# Patient Record
Sex: MALE | Race: WHITE | NOT HISPANIC OR LATINO | Employment: FULL TIME | URBAN - METROPOLITAN AREA
[De-identification: names, ages, dates, MRNs, and addresses within clinical notes are randomized per-mention and may not be internally consistent; named-entity substitution may affect disease eponyms.]

---

## 2017-03-06 RX ORDER — CLOPIDOGREL BISULFATE 75 MG/1
TABLET ORAL
Qty: 30 TABLET | Refills: 5 | Status: SHIPPED | OUTPATIENT
Start: 2017-03-06 | End: 2017-07-18 | Stop reason: SDUPTHER

## 2017-03-06 NOTE — TELEPHONE ENCOUNTER
Provider:  Suhail  Caller:   michel  Phone #:  automated  Surgery:  n/a  Surgery Date:  n/a  Last visit:    7/25/16    TAMIE:         Reason for call:       Automated refill request

## 2017-07-18 DIAGNOSIS — I66.9 CEREBRAL ARTERY OCCLUSION: Primary | ICD-10-CM

## 2017-07-18 RX ORDER — CLOPIDOGREL BISULFATE 75 MG/1
75 TABLET ORAL DAILY
Qty: 30 TABLET | Refills: 2 | Status: SHIPPED | OUTPATIENT
Start: 2017-07-18 | End: 2017-12-11 | Stop reason: SDUPTHER

## 2017-07-18 NOTE — TELEPHONE ENCOUNTER
Provider:  Suhail  Caller: pharmacy fax   Last visit:   7/25/16  Next visit: no    Reason for call:         Refill fax request

## 2017-12-11 DIAGNOSIS — I66.9 CEREBRAL ARTERY OCCLUSION: ICD-10-CM

## 2017-12-11 RX ORDER — CLOPIDOGREL BISULFATE 75 MG/1
75 TABLET ORAL DAILY
Qty: 30 TABLET | Refills: 2 | Status: SHIPPED | OUTPATIENT
Start: 2017-12-11 | End: 2018-03-20 | Stop reason: SDUPTHER

## 2017-12-11 NOTE — TELEPHONE ENCOUNTER
Provider: Suhail    Caller: pharmacy  Time of call:     Phone #: [phone]   Surgery:n/a     Surgery Date:    Last visit:7/25/16     Next visit: n/a    TAMIE:         Reason for call:       Refill request

## 2018-03-20 DIAGNOSIS — I66.9 CEREBRAL ARTERY OCCLUSION: ICD-10-CM

## 2018-03-20 RX ORDER — CLOPIDOGREL BISULFATE 75 MG/1
TABLET ORAL
Qty: 30 TABLET | Refills: 2 | Status: SHIPPED | OUTPATIENT
Start: 2018-03-20 | End: 2018-06-04 | Stop reason: SDUPTHER

## 2018-03-20 NOTE — TELEPHONE ENCOUNTER
Provider:  Suhail  Pharmacy: [facility]  Last visit:   07/25/16  Next visit: none scheduled    Reason for call:       Automated refill request from patient's pharmacy

## 2018-06-04 DIAGNOSIS — I66.9 CEREBRAL ARTERY OCCLUSION: ICD-10-CM

## 2018-06-04 RX ORDER — CLOPIDOGREL BISULFATE 75 MG/1
TABLET ORAL
Qty: 30 TABLET | Refills: 2 | Status: SHIPPED | OUTPATIENT
Start: 2018-06-04 | End: 2018-09-18 | Stop reason: SDUPTHER

## 2018-06-04 NOTE — TELEPHONE ENCOUNTER
Provider:  Suhail  Pharmacy: [facility]  Last visit:   07/25/16  Next visit: none scheduled      Reason for call:         Automated refill request.

## 2018-09-18 DIAGNOSIS — I66.9 CEREBRAL ARTERY OCCLUSION: ICD-10-CM

## 2018-09-18 RX ORDER — CLOPIDOGREL BISULFATE 75 MG/1
TABLET ORAL
Qty: 30 TABLET | Refills: 2 | Status: SHIPPED | OUTPATIENT
Start: 2018-09-18 | End: 2018-12-29 | Stop reason: SDUPTHER

## 2018-09-18 NOTE — TELEPHONE ENCOUNTER
Provider:  Suhail  Caller: pharmacy     Phone #: [phone]   Surgery: n/a    Last visit: 7/2016    Next visit:     TAMIE:         Reason for call:       Requested Prescriptions     Pending Prescriptions Disp Refills   • clopidogrel (PLAVIX) 75 MG tablet [Pharmacy Med Name: CLOPIDOGREL 75 MG TABLET] 30 tablet 2     Sig: TAKE 1 TABLET BY MOUTH DAILY

## 2018-12-29 DIAGNOSIS — I66.9 CEREBRAL ARTERY OCCLUSION: ICD-10-CM

## 2018-12-29 RX ORDER — CLOPIDOGREL BISULFATE 75 MG/1
TABLET ORAL
Qty: 30 TABLET | Refills: 2 | Status: SHIPPED | OUTPATIENT
Start: 2018-12-29 | End: 2019-08-02 | Stop reason: SDUPTHER

## 2018-12-29 NOTE — TELEPHONE ENCOUNTER
Provider:  Suhail  Caller: michel  Time of call:   n/a  Phone #: n/a  Last visit:   7/25/16  Next visit:  none       Reason for call:         Automated refill request.

## 2019-08-02 DIAGNOSIS — I66.9 CEREBRAL ARTERY OCCLUSION: ICD-10-CM

## 2019-08-02 RX ORDER — CLOPIDOGREL BISULFATE 75 MG/1
TABLET ORAL
Qty: 90 TABLET | Refills: 0 | Status: SHIPPED | OUTPATIENT
Start: 2019-08-02 | End: 2019-10-26 | Stop reason: SDUPTHER

## 2019-08-02 NOTE — TELEPHONE ENCOUNTER
Provider:  Suhail  Caller:  Automated refill request  Surgery:  NA  Surgery Date:  NA  Last visit:  7/25/2016  Next visit: TBD    Reason for call:         Requested Prescriptions     Pending Prescriptions Disp Refills   • clopidogrel (PLAVIX) 75 MG tablet [Pharmacy Med Name: CLOPIDOGREL 75 MG TABLET] 90 tablet 0     Sig: TAKE 1 TABLET BY MOUTH EVERY DAY

## 2019-10-26 DIAGNOSIS — I66.9 CEREBRAL ARTERY OCCLUSION: ICD-10-CM

## 2019-10-28 RX ORDER — CLOPIDOGREL BISULFATE 75 MG/1
TABLET ORAL
Qty: 90 TABLET | Refills: 0 | Status: SHIPPED | OUTPATIENT
Start: 2019-10-28 | End: 2020-01-21

## 2019-10-28 NOTE — TELEPHONE ENCOUNTER
Provider: Suhail   Caller:  Automated refill request  Surgery: n/a   Surgery Date: n/a  Last visit: 07/25/2016   Next visit: TBD    Reason for call:         Requested Prescriptions     Pending Prescriptions Disp Refills   • clopidogrel (PLAVIX) 75 MG tablet [Pharmacy Med Name: CLOPIDOGREL 75 MG TABLET] 90 tablet 0     Sig: TAKE 1 TABLET BY MOUTH EVERY DAY

## 2020-01-21 DIAGNOSIS — I66.9 CEREBRAL ARTERY OCCLUSION: ICD-10-CM

## 2020-01-21 RX ORDER — CLOPIDOGREL BISULFATE 75 MG/1
TABLET ORAL
Qty: 90 TABLET | Refills: 2 | Status: SHIPPED | OUTPATIENT
Start: 2020-01-21 | End: 2020-10-19

## 2020-10-17 DIAGNOSIS — I66.9 CEREBRAL ARTERY OCCLUSION: ICD-10-CM

## 2020-10-19 RX ORDER — CLOPIDOGREL BISULFATE 75 MG/1
TABLET ORAL
Qty: 90 TABLET | Refills: 2 | Status: SHIPPED | OUTPATIENT
Start: 2020-10-19

## 2020-10-19 NOTE — TELEPHONE ENCOUNTER
Requested Prescriptions     Pending Prescriptions Disp Refills   • clopidogrel (PLAVIX) 75 MG tablet [Pharmacy Med Name: CLOPIDOGREL 75 MG TABLET] 90 tablet 2     Sig: TAKE 1 TABLET BY MOUTH EVERY DAY

## 2021-01-19 RX ORDER — ATORVASTATIN CALCIUM 80 MG/1
80 TABLET, FILM COATED ORAL DAILY
Qty: 90 TABLET | Refills: 3 | Status: SHIPPED | OUTPATIENT
Start: 2021-01-19 | End: 2022-01-31

## 2021-01-26 ENCOUNTER — LAB (OUTPATIENT)
Dept: LAB | Facility: HOSPITAL | Age: 56
End: 2021-01-26

## 2021-01-26 ENCOUNTER — OFFICE VISIT (OUTPATIENT)
Dept: ENDOCRINOLOGY | Facility: CLINIC | Age: 56
End: 2021-01-26

## 2021-01-26 VITALS
DIASTOLIC BLOOD PRESSURE: 86 MMHG | HEIGHT: 70 IN | SYSTOLIC BLOOD PRESSURE: 138 MMHG | TEMPERATURE: 97.1 F | BODY MASS INDEX: 30.81 KG/M2 | WEIGHT: 215.2 LBS

## 2021-01-26 DIAGNOSIS — E11.65 TYPE 2 DIABETES MELLITUS WITH HYPERGLYCEMIA, WITH LONG-TERM CURRENT USE OF INSULIN (HCC): ICD-10-CM

## 2021-01-26 DIAGNOSIS — Z79.4 TYPE 2 DIABETES MELLITUS WITH HYPERGLYCEMIA, WITH LONG-TERM CURRENT USE OF INSULIN (HCC): ICD-10-CM

## 2021-01-26 DIAGNOSIS — Z79.4 TYPE 2 DIABETES MELLITUS WITH HYPERGLYCEMIA, WITH LONG-TERM CURRENT USE OF INSULIN (HCC): Primary | ICD-10-CM

## 2021-01-26 DIAGNOSIS — E11.65 TYPE 2 DIABETES MELLITUS WITH HYPERGLYCEMIA, WITH LONG-TERM CURRENT USE OF INSULIN (HCC): Primary | ICD-10-CM

## 2021-01-26 DIAGNOSIS — I10 ESSENTIAL HYPERTENSION: ICD-10-CM

## 2021-01-26 DIAGNOSIS — E78.2 MIXED HYPERLIPIDEMIA: ICD-10-CM

## 2021-01-26 LAB
EXPIRATION DATE: NORMAL
HBA1C MFR BLD: 8.7 %
Lab: NORMAL

## 2021-01-26 PROCEDURE — 83036 HEMOGLOBIN GLYCOSYLATED A1C: CPT | Performed by: PHYSICIAN ASSISTANT

## 2021-01-26 PROCEDURE — 80053 COMPREHEN METABOLIC PANEL: CPT

## 2021-01-26 PROCEDURE — 84443 ASSAY THYROID STIM HORMONE: CPT

## 2021-01-26 PROCEDURE — 82570 ASSAY OF URINE CREATININE: CPT

## 2021-01-26 PROCEDURE — 99204 OFFICE O/P NEW MOD 45 MIN: CPT | Performed by: PHYSICIAN ASSISTANT

## 2021-01-26 PROCEDURE — 95251 CONT GLUC MNTR ANALYSIS I&R: CPT | Performed by: PHYSICIAN ASSISTANT

## 2021-01-26 PROCEDURE — 80061 LIPID PANEL: CPT

## 2021-01-26 PROCEDURE — 82043 UR ALBUMIN QUANTITATIVE: CPT

## 2021-01-26 RX ORDER — INSULIN DEGLUDEC INJECTION 100 U/ML
INJECTION, SOLUTION SUBCUTANEOUS
COMMUNITY
Start: 2020-11-17 | End: 2021-04-29

## 2021-01-26 RX ORDER — PROCHLORPERAZINE 25 MG/1
SUPPOSITORY RECTAL
Qty: 9 EACH | Refills: 3 | Status: SHIPPED | OUTPATIENT
Start: 2021-01-26 | End: 2021-12-30 | Stop reason: SDUPTHER

## 2021-01-26 RX ORDER — PROCHLORPERAZINE 25 MG/1
1 SUPPOSITORY RECTAL
Qty: 1 EACH | Refills: 3 | Status: SHIPPED | OUTPATIENT
Start: 2021-01-26 | End: 2021-12-30 | Stop reason: SDUPTHER

## 2021-01-26 RX ORDER — INSULIN ASPART 100 [IU]/ML
INJECTION, SOLUTION INTRAVENOUS; SUBCUTANEOUS
Qty: 15 ML | Refills: 1
Start: 2021-01-26 | End: 2022-02-03 | Stop reason: SDUPTHER

## 2021-01-26 RX ORDER — HYDROCHLOROTHIAZIDE 25 MG/1
25 TABLET ORAL DAILY
COMMUNITY
End: 2021-06-08 | Stop reason: SDUPTHER

## 2021-01-26 RX ORDER — PROCHLORPERAZINE 25 MG/1
SUPPOSITORY RECTAL
COMMUNITY
Start: 2020-12-28 | End: 2021-01-26 | Stop reason: SDUPTHER

## 2021-01-26 NOTE — PROGRESS NOTES
"     Office Note      Date: 2021  Patient Name: Raffy Pepe  MRN: [id number]  : 1965    Chief Complaint   Patient presents with   • Follow-up   • Diabetes       History of Present Illness:   Raffy Pepe is a 55 y.o. male who presents for type 2 diabetes.  He reports he is not surprised his A1c is up some had some higher readings over the holidays in the first week in January he had 3 or 4 days where his blood sugar remained elevated.  He is not sure if he had a virus or what was going on but after a few days his blood sugars came back down.  He remains on Tresiba 38 units daily and NovoLog with meals.  He wears a Dexcom G6 continuous glucose monitor and this is working with no trouble.  He reports he is due for his annual eye exam and will try to get this scheduled.  He does not get flu vaccines.  He denies any trouble with his feet today.  He reports he came fasting today since he is due for labs.  He remains on atorvastatin 80 mg daily for his cholesterol and is tolerating this with no trouble.  He remains on hydrochlorothiazide 25 mg daily and lisinopril 40 mg daily for hypertension.      Subjective     Review of Systems:   Review of Systems   Constitutional: Negative.    Cardiovascular: Negative.    Gastrointestinal: Negative.    Endocrine: Negative.    Neurological: Negative.        The following portions of the patient's history were reviewed and updated as appropriate: allergies, current medications, past family history, past medical history, past social history, past surgical history and problem list.    Objective     Vitals:    21 1015   BP: 138/86   Temp: 97.1 °F (36.2 °C)   TempSrc: Infrared   Weight: 97.6 kg (215 lb 3.2 oz)   Height: 177.8 cm (70\")     Body mass index is 30.88 kg/m².    Physical Exam  Vitals signs reviewed.   Constitutional:       General: He is not in acute distress.     Appearance: Normal appearance.   Cardiovascular:      Pulses:           Dorsalis " pedis pulses are 2+ on the right side and 2+ on the left side.        Posterior tibial pulses are 2+ on the right side and 2+ on the left side.   Feet:      Right foot:      Protective Sensation: 5 sites tested. 5 sites sensed.      Skin integrity: Skin integrity normal.      Toenail Condition: Right toenails are normal.      Left foot:      Protective Sensation: 5 sites tested. 5 sites sensed.      Skin integrity: Skin integrity normal.      Toenail Condition: Left toenails are normal.      Comments: Diabetic Foot Exam Performed and Monofilament Test Performed    Neurological:      Mental Status: He is alert.         HEMOGLOBIN A1C  Lab Results   Component Value Date    HGBA1C 8.7 01/26/2021     Assessment / Plan      Assessment & Plan:  1. Type 2 diabetes mellitus with hyperglycemia, with long-term current use of insulin (CMS/[facility])  A1c above goal and higher than last time this was checked at 8.7%.  I reviewed his Dexcom download we will increase his Tresiba to 40 units daily if his fasting blood sugars continue above 150 mg/dL he will increase to 42 units.  He will also increase his NovoLog by 1 unit at lunch he will continue NovoLog with breakfast and supper the same.  He will send in blood sugars for review and adjustment as needed.  His Dexcom supplies were refilled but he reported he did not need other refills on medications today.  Blood pressures okay today.  Weight is down 4 pounds since his last appointment January 2020.  Encouraged healthy eating habits and physical activity.  Fasting labs pending will send note with results and plan.  Foot exam okay today.  Encouraged him to schedule his eye exam.  - POC Glycosylated Hemoglobin (Hb A1C)  - Comprehensive Metabolic Panel; Future  - Lipid Panel; Future  - TSH; Future  - Microalbumin / Creatinine Urine Ratio - Urine, Clean Catch; Future    2. Essential hypertension  Blood pressure okay today.  He will continue hydrochlorothiazide 25 mg daily and lisinopril  40 mg daily.    3. Mixed hyperlipidemia  Fasting labs pending.  Send note with results and plan.  He will continue atorvastatin 80 mg daily for now.  - Lipid Panel; Future      Return in about 3 months (around 4/26/2021) for Recheck.     Missy Diaz PA-C  01/26/2021

## 2021-01-26 NOTE — PATIENT INSTRUCTIONS
Increase Tresiba to 40 units if fasting blood sugars remain above 150mg/dl increase to 42 units.   Add 1 unit to lunch Novolog.

## 2021-01-27 LAB
ALBUMIN SERPL-MCNC: 4.2 G/DL (ref 3.5–5.2)
ALBUMIN UR-MCNC: <1.2 MG/DL
ALBUMIN/GLOB SERPL: 1.3 G/DL
ALP SERPL-CCNC: 92 U/L (ref 39–117)
ALT SERPL W P-5'-P-CCNC: 16 U/L (ref 1–41)
ANION GAP SERPL CALCULATED.3IONS-SCNC: 10.3 MMOL/L (ref 5–15)
AST SERPL-CCNC: 12 U/L (ref 1–40)
BILIRUB SERPL-MCNC: 0.4 MG/DL (ref 0–1.2)
BUN SERPL-MCNC: 14 MG/DL (ref 6–20)
BUN/CREAT SERPL: 21.2 (ref 7–25)
CALCIUM SPEC-SCNC: 9.9 MG/DL (ref 8.6–10.5)
CHLORIDE SERPL-SCNC: 101 MMOL/L (ref 98–107)
CHOLEST SERPL-MCNC: 146 MG/DL (ref 0–200)
CO2 SERPL-SCNC: 28.7 MMOL/L (ref 22–29)
CREAT SERPL-MCNC: 0.66 MG/DL (ref 0.76–1.27)
CREAT UR-MCNC: 96 MG/DL
GFR SERPL CREATININE-BSD FRML MDRD: 124 ML/MIN/1.73
GLOBULIN UR ELPH-MCNC: 3.3 GM/DL
GLUCOSE SERPL-MCNC: 166 MG/DL (ref 65–99)
HDLC SERPL-MCNC: 40 MG/DL (ref 40–60)
LDLC SERPL CALC-MCNC: 81 MG/DL (ref 0–100)
LDLC/HDLC SERPL: 1.94 {RATIO}
MICROALBUMIN/CREAT UR: NORMAL MG/G{CREAT}
POTASSIUM SERPL-SCNC: 3.6 MMOL/L (ref 3.5–5.2)
PROT SERPL-MCNC: 7.5 G/DL (ref 6–8.5)
SODIUM SERPL-SCNC: 140 MMOL/L (ref 136–145)
TRIGL SERPL-MCNC: 142 MG/DL (ref 0–150)
TSH SERPL DL<=0.05 MIU/L-ACNC: 1.72 UIU/ML (ref 0.27–4.2)
VLDLC SERPL-MCNC: 25 MG/DL (ref 5–40)

## 2021-03-26 RX ORDER — LISINOPRIL 40 MG/1
40 TABLET ORAL DAILY
Qty: 90 TABLET | Refills: 3 | Status: SHIPPED | OUTPATIENT
Start: 2021-03-26 | End: 2021-06-06 | Stop reason: SDUPTHER

## 2021-04-29 ENCOUNTER — OFFICE VISIT (OUTPATIENT)
Dept: ENDOCRINOLOGY | Facility: CLINIC | Age: 56
End: 2021-04-29

## 2021-04-29 VITALS
HEART RATE: 98 BPM | WEIGHT: 216.8 LBS | TEMPERATURE: 97.5 F | OXYGEN SATURATION: 98 % | HEIGHT: 71 IN | SYSTOLIC BLOOD PRESSURE: 130 MMHG | BODY MASS INDEX: 30.35 KG/M2 | DIASTOLIC BLOOD PRESSURE: 68 MMHG

## 2021-04-29 DIAGNOSIS — E11.65 TYPE 2 DIABETES MELLITUS WITH HYPERGLYCEMIA, WITH LONG-TERM CURRENT USE OF INSULIN (HCC): Primary | ICD-10-CM

## 2021-04-29 DIAGNOSIS — Z79.4 TYPE 2 DIABETES MELLITUS WITH HYPERGLYCEMIA, WITH LONG-TERM CURRENT USE OF INSULIN (HCC): Primary | ICD-10-CM

## 2021-04-29 DIAGNOSIS — I10 ESSENTIAL HYPERTENSION: ICD-10-CM

## 2021-04-29 LAB
EXPIRATION DATE: NORMAL
HBA1C MFR BLD: 8.7 %
Lab: NORMAL

## 2021-04-29 PROCEDURE — 83036 HEMOGLOBIN GLYCOSYLATED A1C: CPT | Performed by: PHYSICIAN ASSISTANT

## 2021-04-29 PROCEDURE — 95251 CONT GLUC MNTR ANALYSIS I&R: CPT | Performed by: PHYSICIAN ASSISTANT

## 2021-04-29 PROCEDURE — 99214 OFFICE O/P EST MOD 30 MIN: CPT | Performed by: PHYSICIAN ASSISTANT

## 2021-04-29 RX ORDER — PEN NEEDLE, DIABETIC 32GX 5/32"
NEEDLE, DISPOSABLE MISCELLANEOUS
COMMUNITY
Start: 2021-04-22 | End: 2022-06-30 | Stop reason: SDUPTHER

## 2021-04-29 RX ORDER — INSULIN GLARGINE 100 [IU]/ML
INJECTION, SOLUTION SUBCUTANEOUS
Qty: 30 ML | Refills: 5 | Status: SHIPPED | OUTPATIENT
Start: 2021-04-29 | End: 2022-02-03 | Stop reason: SDUPTHER

## 2021-04-29 NOTE — PROGRESS NOTES
"     Office Note      Date: 2021  Patient Name: Raffy Pepe  MRN: [id number]  : 1965    Chief Complaint   Patient presents with   • Diabetes       History of Present Illness:   Raffy Pepe is a 56 y.o. male who presents for type 2 diabetes.  He reports overall his blood sugars have been okay he has had a few spikes.  He is getting ready to be off for the summer in a few weeks.  He remains on Tresiba and is up to 42 units at bedtime he continues NovoLog 0 to 6 units with breakfast, 0 to 7 units with lunch, and 0 to 6 units with supper.  He remains on the Dexcom G6 continuous glucose monitor.  He denies any trouble with hypoglycemia.  He reports he is due for his eye exam and will try to get this scheduled over the summer.  He has not had the Covid vaccine yet he plans to get it but is not in a rush.  He denies any trouble with his feet today and we did his foot exam and fasting labs at his appointment in January.  He reports the Tresiba insulin is no longer covered and he will need to switch to either Basaglar or Levemir insulin.      Subjective     Review of Systems:   Review of Systems   Constitutional: Negative.    Cardiovascular: Negative.    Gastrointestinal: Negative.    Endocrine: Negative.    Neurological: Negative.        The following portions of the patient's history were reviewed and updated as appropriate: allergies, current medications, past family history, past medical history, past social history, past surgical history and problem list.    Objective     Vitals:    21 0946   BP: 130/68   Pulse: 98   Temp: 97.5 °F (36.4 °C)   TempSrc: Infrared   SpO2: 98%   Weight: 98.3 kg (216 lb 12.8 oz)   Height: 180.3 cm (71\")   PainSc: 0-No pain     Body mass index is 30.24 kg/m².    Physical Exam  Vitals reviewed.   Constitutional:       General: He is not in acute distress.     Appearance: Normal appearance.   Neurological:      Mental Status: He is alert.         HEMOGLOBIN " A1C  Lab Results   Component Value Date    HGBA1C 8.7 04/29/2021         Assessment / Plan      Assessment & Plan:  1. Type 2 diabetes mellitus with hyperglycemia, with long-term current use of insulin (CMS/[facility])  A1c is stable and above goal at 8.7%.  I reviewed his Dexcom download today and his blood sugars have been some better recently.  Fasting blood sugars are still above goal.  We will has some Tresiba left so we will titrate this to units every 4 days until his fasting blood sugar is between 90 and 130 mg/dL.  Advised him once he switches to the Basaglar he will continue the same dosing instructions.  He will continue NovoLog with meals.  Encouraged him to call with blood sugars as needed for review and adjustment.  Blood pressures okay today.  His weight is up 1 pound since his appointment in January.  Encouraged healthy eating habits and physical activity as tolerated.  - POC Glycosylated Hemoglobin (Hb A1C)    2. Essential hypertension  Blood pressure okay today.  He will continue the hydrochlorothiazide 25 mg daily and lisinopril 40 mg daily for now.  Patient to call as needed      Return in about 3 months (around 7/29/2021) for Recheck 3-4.     Missy Diaz PA-C  04/29/2021

## 2021-06-07 RX ORDER — LISINOPRIL 40 MG/1
40 TABLET ORAL DAILY
Qty: 90 TABLET | Refills: 3 | Status: SHIPPED | OUTPATIENT
Start: 2021-06-07 | End: 2022-07-14 | Stop reason: SDUPTHER

## 2021-06-08 RX ORDER — HYDROCHLOROTHIAZIDE 25 MG/1
25 TABLET ORAL DAILY
Qty: 90 TABLET | Refills: 1 | Status: SHIPPED | OUTPATIENT
Start: 2021-06-08 | End: 2021-11-29

## 2021-07-29 ENCOUNTER — OFFICE VISIT (OUTPATIENT)
Dept: ENDOCRINOLOGY | Facility: CLINIC | Age: 56
End: 2021-07-29

## 2021-07-29 VITALS
HEIGHT: 70 IN | HEART RATE: 67 BPM | BODY MASS INDEX: 30.64 KG/M2 | DIASTOLIC BLOOD PRESSURE: 68 MMHG | WEIGHT: 214 LBS | SYSTOLIC BLOOD PRESSURE: 136 MMHG | OXYGEN SATURATION: 97 %

## 2021-07-29 DIAGNOSIS — Z79.4 TYPE 2 DIABETES MELLITUS WITH HYPERGLYCEMIA, WITH LONG-TERM CURRENT USE OF INSULIN (HCC): Primary | ICD-10-CM

## 2021-07-29 DIAGNOSIS — I10 ESSENTIAL HYPERTENSION: ICD-10-CM

## 2021-07-29 DIAGNOSIS — E11.65 TYPE 2 DIABETES MELLITUS WITH HYPERGLYCEMIA, WITH LONG-TERM CURRENT USE OF INSULIN (HCC): Primary | ICD-10-CM

## 2021-07-29 LAB
EXPIRATION DATE: ABNORMAL
EXPIRATION DATE: NORMAL
GLUCOSE BLDC GLUCOMTR-MCNC: 136 MG/DL (ref 70–130)
HBA1C MFR BLD: 7.8 %
Lab: ABNORMAL
Lab: NORMAL

## 2021-07-29 PROCEDURE — 99214 OFFICE O/P EST MOD 30 MIN: CPT | Performed by: PHYSICIAN ASSISTANT

## 2021-07-29 PROCEDURE — 83036 HEMOGLOBIN GLYCOSYLATED A1C: CPT | Performed by: PHYSICIAN ASSISTANT

## 2021-07-29 PROCEDURE — 95251 CONT GLUC MNTR ANALYSIS I&R: CPT | Performed by: PHYSICIAN ASSISTANT

## 2021-07-29 NOTE — PROGRESS NOTES
"     Office Note      Date: 2021  Patient Name: Raffy Pepe  MRN: [id number]  : 1965    Chief Complaint   Patient presents with   • Diabetes       History of Present Illness:   Raffy Pepe is a 56 y.o. male who presents for Type 2 diabetes.  He remains on the Dexcom continuous glucose monitor.  He reports his blood sugars have been better recently.  He has been taking 44 to 45 units of Tresiba and has not had to switch to the Basaglar yet.  He remains on NovoLog 0 to 6 units with breakfast, 0 to 7 units with lunch, and 0 to 6 units with supper.  He reports he has noted a spike in his blood sugars after lunch she feels this may be his coffee.  He denies any trouble with severe hypoglycemia.  He reports he is due for his eye exam he had this scheduled but had to cancel it.  He will try to get this taken care of soon.  He has had both doses of his Covid vaccine since his last appointment.  He denies any trouble with his feet today we did his foot exam at his appointment in January as well as fasting labs.        Subjective     Review of Systems:   Review of Systems   Constitutional: Negative.    Cardiovascular: Negative.    Gastrointestinal: Negative.    Endocrine: Negative.    Neurological: Negative.        The following portions of the patient's history were reviewed and updated as appropriate: allergies, current medications, past family history, past medical history, past social history, past surgical history and problem list.    Objective     Vitals:    21 1133   BP: 136/68   Pulse: 67   SpO2: 97%   Weight: 97.1 kg (214 lb)   Height: 177.8 cm (70\")   PainSc: 0-No pain     Body mass index is 30.71 kg/m².    Physical Exam    HEMOGLOBIN A1C  Lab Results   Component Value Date    HGBA1C 7.8 2021       GLUCOSE  Glucose   Date Value Ref Range Status   2021 136 (A) 70 - 130 mg/dL Final           Assessment / Plan      Assessment & Plan:  1. Type 2 diabetes mellitus with " hyperglycemia, with long-term current use of insulin (CMS/[facility])  His A1c today has improved at 7.8%.  This is still slightly above goal but his Dexcom readings reviewed by me today look much better.  For now we will continue the Tresiba 44 to 45 units daily as well as NovoLog 0 to 6 units with breakfast, 0 to 7 units with lunch and 0 to 6 units with supper.  Encouraged healthy eating habits and physical activity as tolerated.  I encouraged him to schedule his eye exam.  He did not need any prescription sent in today.  - POC Glucose, Blood  - POC Glycosylated Hemoglobin (Hb A1C)    2. Essential hypertension  His blood pressures okay today.  He will continue the lisinopril 40 mg daily as well as hydrochlorothiazide 25 mg daily.  Patient to call as needed.      Return in about 3 months (around 10/29/2021) for Recheck 3-4 mos.     This note was dictated using Dragon voice recognition.    Missy Diaz PA-C  07/29/2021

## 2021-11-29 RX ORDER — HYDROCHLOROTHIAZIDE 25 MG/1
TABLET ORAL
Qty: 90 TABLET | Refills: 0 | Status: SHIPPED | OUTPATIENT
Start: 2021-11-29 | End: 2022-02-03 | Stop reason: SDUPTHER

## 2021-12-30 RX ORDER — PROCHLORPERAZINE 25 MG/1
1 SUPPOSITORY RECTAL
Qty: 1 EACH | Refills: 3 | Status: SHIPPED | OUTPATIENT
Start: 2021-12-30 | End: 2022-11-01 | Stop reason: SDUPTHER

## 2021-12-30 RX ORDER — PROCHLORPERAZINE 25 MG/1
SUPPOSITORY RECTAL
Qty: 9 EACH | Refills: 3 | Status: SHIPPED | OUTPATIENT
Start: 2021-12-30 | End: 2022-11-01 | Stop reason: SDUPTHER

## 2022-01-31 RX ORDER — ATORVASTATIN CALCIUM 80 MG/1
TABLET, FILM COATED ORAL
Qty: 90 TABLET | Refills: 1 | Status: SHIPPED | OUTPATIENT
Start: 2022-01-31 | End: 2022-07-14 | Stop reason: SDUPTHER

## 2022-02-03 ENCOUNTER — LAB (OUTPATIENT)
Dept: LAB | Facility: HOSPITAL | Age: 57
End: 2022-02-03

## 2022-02-03 ENCOUNTER — OFFICE VISIT (OUTPATIENT)
Dept: ENDOCRINOLOGY | Facility: CLINIC | Age: 57
End: 2022-02-03

## 2022-02-03 VITALS
WEIGHT: 215 LBS | BODY MASS INDEX: 30.78 KG/M2 | OXYGEN SATURATION: 100 % | HEART RATE: 70 BPM | DIASTOLIC BLOOD PRESSURE: 77 MMHG | SYSTOLIC BLOOD PRESSURE: 128 MMHG | HEIGHT: 70 IN

## 2022-02-03 DIAGNOSIS — E11.65 TYPE 2 DIABETES MELLITUS WITH HYPERGLYCEMIA, WITH LONG-TERM CURRENT USE OF INSULIN: Primary | ICD-10-CM

## 2022-02-03 DIAGNOSIS — E78.2 MIXED HYPERLIPIDEMIA: ICD-10-CM

## 2022-02-03 DIAGNOSIS — Z79.4 TYPE 2 DIABETES MELLITUS WITH HYPERGLYCEMIA, WITH LONG-TERM CURRENT USE OF INSULIN: ICD-10-CM

## 2022-02-03 DIAGNOSIS — E11.65 TYPE 2 DIABETES MELLITUS WITH HYPERGLYCEMIA, WITH LONG-TERM CURRENT USE OF INSULIN: ICD-10-CM

## 2022-02-03 DIAGNOSIS — I10 ESSENTIAL HYPERTENSION: ICD-10-CM

## 2022-02-03 DIAGNOSIS — Z79.4 TYPE 2 DIABETES MELLITUS WITH HYPERGLYCEMIA, WITH LONG-TERM CURRENT USE OF INSULIN: Primary | ICD-10-CM

## 2022-02-03 LAB
ALBUMIN SERPL-MCNC: 4.5 G/DL (ref 3.5–5.2)
ALBUMIN/GLOB SERPL: 1.5 G/DL
ALP SERPL-CCNC: 84 U/L (ref 39–117)
ALT SERPL W P-5'-P-CCNC: 15 U/L (ref 1–41)
ANION GAP SERPL CALCULATED.3IONS-SCNC: 13 MMOL/L (ref 5–15)
AST SERPL-CCNC: 16 U/L (ref 1–40)
BILIRUB SERPL-MCNC: 0.5 MG/DL (ref 0–1.2)
BUN SERPL-MCNC: 11 MG/DL (ref 6–20)
BUN/CREAT SERPL: 15.1 (ref 7–25)
CALCIUM SPEC-SCNC: 9.7 MG/DL (ref 8.6–10.5)
CHLORIDE SERPL-SCNC: 99 MMOL/L (ref 98–107)
CHOLEST SERPL-MCNC: 213 MG/DL (ref 0–200)
CO2 SERPL-SCNC: 29 MMOL/L (ref 22–29)
CREAT SERPL-MCNC: 0.73 MG/DL (ref 0.76–1.27)
EXPIRATION DATE: ABNORMAL
EXPIRATION DATE: NORMAL
GFR SERPL CREATININE-BSD FRML MDRD: 110 ML/MIN/1.73
GLOBULIN UR ELPH-MCNC: 3 GM/DL
GLUCOSE BLDC GLUCOMTR-MCNC: 213 MG/DL (ref 70–130)
GLUCOSE SERPL-MCNC: 209 MG/DL (ref 65–99)
HBA1C MFR BLD: 9.2 %
HDLC SERPL-MCNC: 53 MG/DL (ref 40–60)
LDLC SERPL CALC-MCNC: 133 MG/DL (ref 0–100)
LDLC/HDLC SERPL: 2.44 {RATIO}
Lab: ABNORMAL
Lab: NORMAL
POTASSIUM SERPL-SCNC: 3.6 MMOL/L (ref 3.5–5.2)
PROT SERPL-MCNC: 7.5 G/DL (ref 6–8.5)
SODIUM SERPL-SCNC: 141 MMOL/L (ref 136–145)
TRIGL SERPL-MCNC: 154 MG/DL (ref 0–150)
TSH SERPL DL<=0.05 MIU/L-ACNC: 1.22 UIU/ML (ref 0.27–4.2)
VLDLC SERPL-MCNC: 27 MG/DL (ref 5–40)

## 2022-02-03 PROCEDURE — 82043 UR ALBUMIN QUANTITATIVE: CPT

## 2022-02-03 PROCEDURE — 82570 ASSAY OF URINE CREATININE: CPT

## 2022-02-03 PROCEDURE — 80053 COMPREHEN METABOLIC PANEL: CPT

## 2022-02-03 PROCEDURE — 84443 ASSAY THYROID STIM HORMONE: CPT

## 2022-02-03 PROCEDURE — 99214 OFFICE O/P EST MOD 30 MIN: CPT | Performed by: PHYSICIAN ASSISTANT

## 2022-02-03 PROCEDURE — 95251 CONT GLUC MNTR ANALYSIS I&R: CPT | Performed by: PHYSICIAN ASSISTANT

## 2022-02-03 PROCEDURE — 83036 HEMOGLOBIN GLYCOSYLATED A1C: CPT | Performed by: PHYSICIAN ASSISTANT

## 2022-02-03 PROCEDURE — 80061 LIPID PANEL: CPT

## 2022-02-03 RX ORDER — INSULIN ASPART 100 [IU]/ML
INJECTION, SOLUTION INTRAVENOUS; SUBCUTANEOUS
Qty: 15 ML | Refills: 1 | Status: SHIPPED | OUTPATIENT
Start: 2022-02-03 | End: 2022-02-22 | Stop reason: SDUPTHER

## 2022-02-03 RX ORDER — INSULIN GLARGINE 100 [IU]/ML
INJECTION, SOLUTION SUBCUTANEOUS
Qty: 54 ML | Refills: 2 | Status: SHIPPED | OUTPATIENT
Start: 2022-02-03 | End: 2022-02-22 | Stop reason: SDUPTHER

## 2022-02-03 RX ORDER — HYDROCHLOROTHIAZIDE 25 MG/1
25 TABLET ORAL DAILY
Qty: 90 TABLET | Refills: 2 | Status: SHIPPED | OUTPATIENT
Start: 2022-02-03 | End: 2022-11-01 | Stop reason: SDUPTHER

## 2022-02-03 NOTE — PROGRESS NOTES
"     Office Note      Date: 2022  Patient Name: Raffy Pepe  MRN: [id number]  : 1965    Chief Complaint   Patient presents with   • Diabetes       History of Present Illness:   Raffy Pepe is a 56 y.o. male who presents for follow up for Type 2 diabetes.  He reports he feels well today with no concerns or complaints.  He remains on the Dexcom G6 continuous glucose monitor.  He remains on Tresiba 44 to 46 units daily he has not yet started Basaglar and continues NovoLog 0 to 6 units with breakfast, 0 to 7 units with lunch and 0 to 6 units with supper.  He reports he is not surprised his A1c is elevated he has been eating too many carbohydrates recently.  He denies any trouble with hypoglycemia.  He reports he is overdue for his eye exam and plans to call to get this scheduled during spring break.  He has had his COVID vaccine and does not get flu vaccines.  He denies any trouble with his feet today.  He is fasting today for labs.  He remains on atorvastatin 80 mg daily.      Subjective     Review of Systems:   Review of Systems   Constitutional: Negative.    Cardiovascular: Negative.    Gastrointestinal: Negative.    Endocrine: Negative.    Neurological: Negative.        The following portions of the patient's history were reviewed and updated as appropriate: allergies, current medications, past family history, past medical history, past social history, past surgical history and problem list.    Objective     Vitals:    22 0956   BP: 128/77   Pulse: 70   SpO2: 100%   Weight: 97.5 kg (215 lb)   Height: 177.8 cm (70\")     Body mass index is 30.85 kg/m².    Physical Exam  Vitals reviewed.   Constitutional:       General: He is not in acute distress.     Appearance: Normal appearance.   Cardiovascular:      Pulses:           Dorsalis pedis pulses are 2+ on the right side and 2+ on the left side.        Posterior tibial pulses are 2+ on the right side and 2+ on the left side. "   Musculoskeletal:      Right foot: No deformity.      Left foot: No deformity.   Feet:      Right foot:      Protective Sensation: 5 sites tested. 5 sites sensed.      Skin integrity: Skin integrity normal.      Toenail Condition: Right toenails are normal.      Left foot:      Protective Sensation: 5 sites tested. 5 sites sensed.      Skin integrity: Skin integrity normal.      Toenail Condition: Left toenails are normal.      Comments: Diabetic Foot Exam Performed and Monofilament Test Performed    Neurological:      Mental Status: He is alert.         HEMOGLOBIN A1C  Lab Results   Component Value Date    HGBA1C 9.2 02/03/2022       GLUCOSE  Glucose   Date Value Ref Range Status   02/03/2022 213 (A) 70 - 130 mg/dL Final         Assessment / Plan      Assessment & Plan:  1. Type 2 diabetes mellitus with hyperglycemia, with long-term current use of insulin ([facility])  His A1c is much higher than it was in July at 9.2%.  This is well above goal.  I reviewed his Dexcom download readings today and his blood sugars have been much better the last 2 weeks.  We will increase his Tresiba to 46 to 48 units daily he will continue this dose once he starts the Basaglar.  He will continue NovoLog 0 to 6 units with breakfast and supper and 0 to 7 units with lunch.  I encouraged him to send in blood sugar readings for review and adjustment as needed.  I encouraged healthier eating habits and limiting carbohydrate intake.  I refilled his Basaglar and NovoLog today.  His blood pressure is okay today.  His weight is up 1 pound since his appointment in July.  His foot exam was okay today.  His fasting labs are pending.  Will send note with results and plan.  I encouraged him to schedule an eye exam ASAP.  - POC Glucose, Blood  - POC Glycosylated Hemoglobin (Hb A1C)  - Comprehensive Metabolic Panel; Future  - Lipid Panel; Future  - Microalbumin / Creatinine Urine Ratio - Urine, Clean Catch; Future  - TSH; Future    2. Essential  hypertension  His blood pressure is okay today he will continue the lisinopril 40 mg daily as well as the hydrochlorothiazide 25 mg daily.  I refilled his hydrochlorothiazide today.    3. Mixed hyperlipidemia  Fasting labs pending.  Will send note with results and plan.  For now he will continue atorvastatin 80 mg daily.  Patient to call as needed.  - Lipid Panel; Future      Return in about 3 months (around 5/3/2022) for Recheck 3-4 mos.     This note was dictated using Dragon voice recognition.    Missy Diaz PA-C  02/03/2022

## 2022-02-04 LAB
ALBUMIN UR-MCNC: 5.6 MG/DL
CREAT UR-MCNC: 132 MG/DL
MICROALBUMIN/CREAT UR: 42.4 MG/G

## 2022-02-23 RX ORDER — INSULIN ASPART 100 [IU]/ML
INJECTION, SOLUTION INTRAVENOUS; SUBCUTANEOUS
Qty: 15 ML | Refills: 0 | Status: SHIPPED | OUTPATIENT
Start: 2022-02-23 | End: 2022-05-16

## 2022-02-23 RX ORDER — INSULIN GLARGINE 100 [IU]/ML
INJECTION, SOLUTION SUBCUTANEOUS
Qty: 54 ML | Refills: 0 | Status: SHIPPED | OUTPATIENT
Start: 2022-02-23 | End: 2022-07-14 | Stop reason: SDUPTHER

## 2022-05-16 RX ORDER — INSULIN ASPART 100 [IU]/ML
INJECTION, SOLUTION INTRAVENOUS; SUBCUTANEOUS
Qty: 6 ML | Refills: 0 | Status: SHIPPED | OUTPATIENT
Start: 2022-05-16 | End: 2022-07-14 | Stop reason: SDUPTHER

## 2022-06-30 RX ORDER — PEN NEEDLE, DIABETIC 32GX 5/32"
NEEDLE, DISPOSABLE MISCELLANEOUS
Qty: 450 EACH | Refills: 1 | Status: SHIPPED | OUTPATIENT
Start: 2022-06-30 | End: 2023-01-09

## 2022-07-14 ENCOUNTER — OFFICE VISIT (OUTPATIENT)
Dept: ENDOCRINOLOGY | Facility: CLINIC | Age: 57
End: 2022-07-14

## 2022-07-14 VITALS
WEIGHT: 208.8 LBS | HEIGHT: 70 IN | BODY MASS INDEX: 29.89 KG/M2 | SYSTOLIC BLOOD PRESSURE: 183 MMHG | DIASTOLIC BLOOD PRESSURE: 103 MMHG | OXYGEN SATURATION: 96 % | HEART RATE: 112 BPM

## 2022-07-14 DIAGNOSIS — E11.65 TYPE 2 DIABETES MELLITUS WITH HYPERGLYCEMIA, WITH LONG-TERM CURRENT USE OF INSULIN: Primary | ICD-10-CM

## 2022-07-14 DIAGNOSIS — E78.2 MIXED HYPERLIPIDEMIA: ICD-10-CM

## 2022-07-14 DIAGNOSIS — I10 ESSENTIAL HYPERTENSION: ICD-10-CM

## 2022-07-14 DIAGNOSIS — Z79.4 TYPE 2 DIABETES MELLITUS WITH HYPERGLYCEMIA, WITH LONG-TERM CURRENT USE OF INSULIN: Primary | ICD-10-CM

## 2022-07-14 LAB
EXPIRATION DATE: ABNORMAL
EXPIRATION DATE: NORMAL
GLUCOSE BLDC GLUCOMTR-MCNC: 164 MG/DL (ref 70–130)
HBA1C MFR BLD: 8.5 %
Lab: ABNORMAL
Lab: NORMAL

## 2022-07-14 PROCEDURE — 99214 OFFICE O/P EST MOD 30 MIN: CPT | Performed by: PHYSICIAN ASSISTANT

## 2022-07-14 PROCEDURE — 95251 CONT GLUC MNTR ANALYSIS I&R: CPT | Performed by: PHYSICIAN ASSISTANT

## 2022-07-14 PROCEDURE — 83036 HEMOGLOBIN GLYCOSYLATED A1C: CPT | Performed by: PHYSICIAN ASSISTANT

## 2022-07-14 RX ORDER — INSULIN GLARGINE 100 [IU]/ML
INJECTION, SOLUTION SUBCUTANEOUS
Qty: 54 ML | Refills: 2 | Status: SHIPPED | OUTPATIENT
Start: 2022-07-14

## 2022-07-14 RX ORDER — LISINOPRIL 40 MG/1
40 TABLET ORAL DAILY
Qty: 90 TABLET | Refills: 3 | Status: SHIPPED | OUTPATIENT
Start: 2022-07-14 | End: 2022-10-12

## 2022-07-14 RX ORDER — INSULIN ASPART 100 [IU]/ML
INJECTION, SOLUTION INTRAVENOUS; SUBCUTANEOUS
Qty: 21 ML | Refills: 1 | Status: SHIPPED | OUTPATIENT
Start: 2022-07-14

## 2022-07-14 RX ORDER — ATORVASTATIN CALCIUM 80 MG/1
80 TABLET, FILM COATED ORAL DAILY
Qty: 90 TABLET | Refills: 2 | Status: SHIPPED | OUTPATIENT
Start: 2022-07-14

## 2022-07-14 NOTE — PROGRESS NOTES
"     Office Note      Date: 2022  Patient Name: Raffy Pepe  MRN: [id number]  : 1965    Chief Complaint   Patient presents with   • Diabetes       History of Present Illness:   Raffy Pepe is a 57 y.o. male who presents for follow-up for type 2 diabetes.  He is taking Basaglar 46 to 48 units daily and NovoLog 0 to 6 units with breakfast up to 7 units with lunch and up to 7 units with supper.  He continues to wear the Dexcom G6 continuous glucose monitor.  He denies any trouble with severe or frequent hypoglycemia.  He reports he forgot to take his blood pressure medications this morning.  His mother was in the emergency department last night and he did not get home until 3 AM and did not get much sleep.  He is up-to-date on his eye exam he had this completed .  He reports all was okay.  He denies any trouble with his feet today we did his foot exam as well as fasting labs at his appointment in February.  He reports he is taking his atorvastatin 80 mg regularly.      Subjective     Review of Systems:   Review of Systems   Constitutional: Negative.    Cardiovascular: Negative.    Gastrointestinal: Negative.    Endocrine: Negative.    Neurological: Negative.        The following portions of the patient's history were reviewed and updated as appropriate: allergies, current medications, past family history, past medical history, past social history, past surgical history and problem list.    Objective     Vitals:    22 1134   BP: (!) 183/103   Pulse: 112   SpO2: 96%   Weight: 94.7 kg (208 lb 12.8 oz)   Height: 177.8 cm (70\")   PainSc: 0-No pain     Body mass index is 29.96 kg/m².    Physical Exam  Vitals reviewed.   Constitutional:       General: He is not in acute distress.     Appearance: Normal appearance.   Neurological:      Mental Status: He is alert.         HEMOGLOBIN A1C  Lab Results   Component Value Date    HGBA1C 8.5 2022       GLUCOSE  Glucose   Date Value Ref " Range Status   07/14/2022 164 (A) 70 - 130 mg/dL Final       CMP  Lab Results   Component Value Date    GLUCOSE 209 (H) 02/03/2022    BUN 11 02/03/2022    CREATININE 0.73 (L) 02/03/2022    EGFRIFNONA 110 02/03/2022    BCR 15.1 02/03/2022    K 3.6 02/03/2022    CO2 29.0 02/03/2022    CALCIUM 9.7 02/03/2022    AST 16 02/03/2022    ALT 15 02/03/2022       LIPID PANEL  Lab Results   Component Value Date    CHOL 213 (H) 02/03/2022    CHLPL 207 (H) 03/25/2016    TRIG 154 (H) 02/03/2022    HDL 53 02/03/2022     (H) 02/03/2022       URINE MICROALBUMIN/CREATININE RATIO  Microalbumin/Creatinine Ratio   Date Value Ref Range Status   02/03/2022 42.4 mg/g Final       TSH  Lab Results   Component Value Date    TSH 1.220 02/03/2022       Assessment / Plan      Assessment & Plan:  1. Type 2 diabetes mellitus with hyperglycemia, with long-term current use of insulin (HCC)  His hemoglobin A1c has improved as compared to February at 8.5%.  This is still above goal.  Reviewed his Dexcom readings today and his blood sugars have been better recently.  He still has a spike after lunch and his fasting blood sugars are up a little bit.  We will increase his Tresiba to 48 to 50 units daily and he will add 1 unit to his lunch NovoLog dose.  I encouraged him to send in blood sugar readings for review and adjustment as needed.  I refilled his insulins today.  His weight is down 7 pounds since his appointment in February.  I encouraged healthy eating habits and physical activity as tolerated.  - POC Glucose, Blood  - POC Glycosylated Hemoglobin (Hb A1C)    2. Essential hypertension  His blood pressure is elevated today.  He forgot his blood pressure medications this morning as he was in the emergency department with his mom last night and did not get home until very late.  He reports he will take his medication when he gets home and monitor his blood pressure readings.  He will contact me if these remain elevated for additional  medication.    3. Mixed hyperlipidemia  Fasting cholesterol numbers were above goal last visit.  We discussed the importance of taking atorvastatin 80 mg daily we will plan to check fasting labs next visit.  I refilled his atorvastatin today.      Return in about 3 months (around 10/14/2022) for Recheck 3-4 mos, fasting.     This note was dictated using Dragon voice recognition.    Missy Diaz PA-C  07/14/2022

## 2022-10-25 ENCOUNTER — PRIOR AUTHORIZATION (OUTPATIENT)
Dept: ENDOCRINOLOGY | Facility: CLINIC | Age: 57
End: 2022-10-25

## 2022-10-25 NOTE — TELEPHONE ENCOUNTER
BRITT CABRERA Key: [id number] - PA Case ID: [id number] - Rx #: [id number] help? Call us at [phone]  Outcome  Approvedtoday  Your PA request has been approved. Additional information will be provided in the approval communication. (Message 1145)  Drug  Dexcom G6 Sensor  Form  [facility] Electronic PA Form (2017 [employer])

## 2022-11-01 ENCOUNTER — OFFICE VISIT (OUTPATIENT)
Dept: ENDOCRINOLOGY | Facility: CLINIC | Age: 57
End: 2022-11-01

## 2022-11-01 ENCOUNTER — LAB (OUTPATIENT)
Dept: LAB | Facility: HOSPITAL | Age: 57
End: 2022-11-01

## 2022-11-01 VITALS
DIASTOLIC BLOOD PRESSURE: 92 MMHG | SYSTOLIC BLOOD PRESSURE: 166 MMHG | BODY MASS INDEX: 30.64 KG/M2 | HEIGHT: 70 IN | OXYGEN SATURATION: 98 % | HEART RATE: 86 BPM | WEIGHT: 214 LBS

## 2022-11-01 DIAGNOSIS — E78.2 MIXED HYPERLIPIDEMIA: ICD-10-CM

## 2022-11-01 DIAGNOSIS — Z79.4 TYPE 2 DIABETES MELLITUS WITH HYPERGLYCEMIA, WITH LONG-TERM CURRENT USE OF INSULIN: ICD-10-CM

## 2022-11-01 DIAGNOSIS — Z79.4 TYPE 2 DIABETES MELLITUS WITH HYPERGLYCEMIA, WITH LONG-TERM CURRENT USE OF INSULIN: Primary | ICD-10-CM

## 2022-11-01 DIAGNOSIS — E11.65 TYPE 2 DIABETES MELLITUS WITH HYPERGLYCEMIA, WITH LONG-TERM CURRENT USE OF INSULIN: Primary | ICD-10-CM

## 2022-11-01 DIAGNOSIS — E11.65 TYPE 2 DIABETES MELLITUS WITH HYPERGLYCEMIA, WITH LONG-TERM CURRENT USE OF INSULIN: ICD-10-CM

## 2022-11-01 DIAGNOSIS — I10 ESSENTIAL HYPERTENSION: ICD-10-CM

## 2022-11-01 LAB
ALBUMIN SERPL-MCNC: 4.3 G/DL (ref 3.5–5.2)
ALBUMIN/GLOB SERPL: 1.4 G/DL
ALP SERPL-CCNC: 92 U/L (ref 39–117)
ALT SERPL W P-5'-P-CCNC: 24 U/L (ref 1–41)
ANION GAP SERPL CALCULATED.3IONS-SCNC: 9.1 MMOL/L (ref 5–15)
AST SERPL-CCNC: 20 U/L (ref 1–40)
BILIRUB SERPL-MCNC: 0.5 MG/DL (ref 0–1.2)
BUN SERPL-MCNC: 12 MG/DL (ref 6–20)
BUN/CREAT SERPL: 15.4 (ref 7–25)
CALCIUM SPEC-SCNC: 10 MG/DL (ref 8.6–10.5)
CHLORIDE SERPL-SCNC: 103 MMOL/L (ref 98–107)
CHOLEST SERPL-MCNC: 175 MG/DL (ref 0–200)
CO2 SERPL-SCNC: 29.9 MMOL/L (ref 22–29)
CREAT SERPL-MCNC: 0.78 MG/DL (ref 0.76–1.27)
EGFRCR SERPLBLD CKD-EPI 2021: 102.7 ML/MIN/1.73
EXPIRATION DATE: ABNORMAL
EXPIRATION DATE: NORMAL
GLOBULIN UR ELPH-MCNC: 3.1 GM/DL
GLUCOSE BLDC GLUCOMTR-MCNC: 142 MG/DL (ref 70–130)
GLUCOSE SERPL-MCNC: 132 MG/DL (ref 65–99)
HBA1C MFR BLD: 9 %
HDLC SERPL-MCNC: 55 MG/DL (ref 40–60)
LDLC SERPL CALC-MCNC: 98 MG/DL (ref 0–100)
LDLC/HDLC SERPL: 1.74 {RATIO}
Lab: ABNORMAL
Lab: NORMAL
POTASSIUM SERPL-SCNC: 4.2 MMOL/L (ref 3.5–5.2)
PROT SERPL-MCNC: 7.4 G/DL (ref 6–8.5)
SODIUM SERPL-SCNC: 142 MMOL/L (ref 136–145)
TRIGL SERPL-MCNC: 122 MG/DL (ref 0–150)
VLDLC SERPL-MCNC: 22 MG/DL (ref 5–40)

## 2022-11-01 PROCEDURE — 83036 HEMOGLOBIN GLYCOSYLATED A1C: CPT | Performed by: PHYSICIAN ASSISTANT

## 2022-11-01 PROCEDURE — 80053 COMPREHEN METABOLIC PANEL: CPT

## 2022-11-01 PROCEDURE — 99214 OFFICE O/P EST MOD 30 MIN: CPT | Performed by: PHYSICIAN ASSISTANT

## 2022-11-01 PROCEDURE — 80061 LIPID PANEL: CPT

## 2022-11-01 PROCEDURE — 95251 CONT GLUC MNTR ANALYSIS I&R: CPT | Performed by: PHYSICIAN ASSISTANT

## 2022-11-01 RX ORDER — HYDROCHLOROTHIAZIDE 25 MG/1
25 TABLET ORAL DAILY
Qty: 90 TABLET | Refills: 2 | Status: SHIPPED | OUTPATIENT
Start: 2022-11-01

## 2022-11-01 RX ORDER — AMLODIPINE BESYLATE 5 MG/1
5 TABLET ORAL DAILY
Qty: 90 TABLET | Refills: 1 | Status: SHIPPED | OUTPATIENT
Start: 2022-11-01 | End: 2023-11-01

## 2022-11-01 RX ORDER — PROCHLORPERAZINE 25 MG/1
1 SUPPOSITORY RECTAL
Qty: 1 EACH | Refills: 3 | Status: SHIPPED | OUTPATIENT
Start: 2022-11-01

## 2022-11-01 RX ORDER — PROCHLORPERAZINE 25 MG/1
SUPPOSITORY RECTAL
Qty: 9 EACH | Refills: 3 | Status: SHIPPED | OUTPATIENT
Start: 2022-11-01

## 2022-11-01 NOTE — PROGRESS NOTES
"     Office Note      Date: 2022  Patient Name: Raffy Pepe  MRN: [id number]  : 1965    Chief Complaint   Patient presents with   • Diabetes       History of Present Illness:   Raffy Pepe is a 57 y.o. male who presents for follow-up for type 2 diabetes.  He remains on Basaglar 48 to 50 units daily and NovoLog 0 to 7 units with breakfast, 8 units with lunch and supper.  He remains on the Dexcom continuous glucose monitor.  He reports recently his blood sugar readings have been okay but he does admit to missing his medication once every few weeks.  He denies any severe or frequent hypoglycemia.  If he does get low it tends to be overnight or early morning.  He is up-to-date on his eye exam he had his appointment in July.  He denies any trouble with his feet today do his foot exam as well as fasting labs in February.  He reports he is taking his atorvastatin 80 mg regularly and is fasting for labs today.  He reports he was monitoring his blood pressure following his last appointment and his readings were typically 126-146/71-80.  He reports he is taking the hydrochlorothiazide 25 mg daily and lisinopril 40 mg daily.      Subjective     Review of Systems:   Review of Systems   Constitutional: Negative.    Cardiovascular: Negative.    Gastrointestinal: Negative.    Endocrine: Negative.    Neurological: Negative.        The following portions of the patient's history were reviewed and updated as appropriate: allergies, current medications, past family history, past medical history, past social history, past surgical history and problem list.    Objective     Vitals:    22 0939   BP: 166/92   Pulse: 86   SpO2: 98%   Weight: 97.1 kg (214 lb)   Height: 177.8 cm (70\")     Body mass index is 30.71 kg/m².    Physical Exam  Vitals reviewed.   Constitutional:       General: He is not in acute distress.     Appearance: Normal appearance.   Neurological:      Mental Status: He is alert. "         HEMOGLOBIN A1C  Lab Results   Component Value Date    HGBA1C 9.0 11/01/2022       GLUCOSE  Glucose   Date Value Ref Range Status   11/01/2022 142 (A) 70 - 130 mg/dL Final       CMP  Lab Results   Component Value Date    GLUCOSE 209 (H) 02/03/2022    BUN 11 02/03/2022    CREATININE 0.73 (L) 02/03/2022    EGFRIFNONA 110 02/03/2022    BCR 15.1 02/03/2022    K 3.6 02/03/2022    CO2 29.0 02/03/2022    CALCIUM 9.7 02/03/2022    AST 16 02/03/2022    ALT 15 02/03/2022       LIPID PANEL  Lab Results   Component Value Date    CHOL 213 (H) 02/03/2022    CHLPL 207 (H) 03/25/2016    TRIG 154 (H) 02/03/2022    HDL 53 02/03/2022     (H) 02/03/2022       URINE MICROALBUMIN/CREATININE RATIO  Microalbumin/Creatinine Ratio   Date Value Ref Range Status   02/03/2022 42.4 mg/g Final       TSH  Lab Results   Component Value Date    TSH 1.220 02/03/2022       Assessment / Plan      Assessment & Plan:  1. Type 2 diabetes mellitus with hyperglycemia, with long-term current use of insulin (HCC)  His hemoglobin A1c was higher than it was in July and above goal at 9.0%.  I reviewed his Dexcom download and his blood sugars recently have been better on average than 9.0%.  He does have some higher readings after breakfast and lunch and in the evenings.  We will increase his insulin with meals by 1 unit.  He will continue Basaglar 48 to 50 units daily.  I encouraged him to try to remember to take his medication regularly.  I gave him a new welcome Dexcom kit today as he has had trouble getting a new transmitter.  I also refilled his Dexcom supplies.  His weight is up 6 pounds since his appointment in July.  I encouraged healthy eating habits and physical activity as tolerated.  Fasting labs pending today.  Will send note with results and plan.  He had not been taking his atorvastatin regularly last time we checked labs.  - POC Glucose, Blood  - POC Glycosylated Hemoglobin (Hb A1C)  - Lipid Panel; Future  - Comprehensive Metabolic  Panel; Future    2. Essential hypertension  His blood pressure is elevated today.  We will add amlodipine 5 mg daily.  We discussed this medication I sent a new prescription to his pharmacy.  He will continue hydrochlorothiazide 25 mg daily as well as lisinopril 40 mg daily.  I refilled his hydrochlorothiazide today.    3. Mixed hyperlipidemia  Fasting lipids pending.  Will send note with results and plan.  He reports he has been back on his atorvastatin 80 mg regularly.  - Lipid Panel; Future  - Comprehensive Metabolic Panel; Future      Return in about 3 months (around 2/1/2023) for Recheck 3-4 mos.     This note was dictated using Dragon voice recognition.    Missy Diaz PA-C  11/01/2022

## 2023-01-09 RX ORDER — PEN NEEDLE, DIABETIC 32GX 5/32"
NEEDLE, DISPOSABLE MISCELLANEOUS
Qty: 400 EACH | Refills: 1 | Status: SHIPPED | OUTPATIENT
Start: 2023-01-09

## 2023-05-04 RX ORDER — ATORVASTATIN CALCIUM 80 MG/1
TABLET, FILM COATED ORAL
Qty: 90 TABLET | Refills: 1 | Status: SHIPPED | OUTPATIENT
Start: 2023-05-04

## 2023-05-11 ENCOUNTER — OFFICE VISIT (OUTPATIENT)
Dept: ENDOCRINOLOGY | Facility: CLINIC | Age: 58
End: 2023-05-11
Payer: COMMERCIAL

## 2023-05-11 VITALS
WEIGHT: 215 LBS | BODY MASS INDEX: 30.78 KG/M2 | SYSTOLIC BLOOD PRESSURE: 148 MMHG | DIASTOLIC BLOOD PRESSURE: 70 MMHG | HEIGHT: 70 IN | HEART RATE: 98 BPM | OXYGEN SATURATION: 98 %

## 2023-05-11 DIAGNOSIS — I10 ESSENTIAL HYPERTENSION: ICD-10-CM

## 2023-05-11 DIAGNOSIS — E11.65 TYPE 2 DIABETES MELLITUS WITH HYPERGLYCEMIA, WITH LONG-TERM CURRENT USE OF INSULIN: Primary | ICD-10-CM

## 2023-05-11 DIAGNOSIS — Z79.4 TYPE 2 DIABETES MELLITUS WITH HYPERGLYCEMIA, WITH LONG-TERM CURRENT USE OF INSULIN: Primary | ICD-10-CM

## 2023-05-11 LAB
EXPIRATION DATE: ABNORMAL
EXPIRATION DATE: NORMAL
GLUCOSE BLDC GLUCOMTR-MCNC: 168 MG/DL (ref 70–130)
HBA1C MFR BLD: 9.6 %
Lab: ABNORMAL
Lab: NORMAL

## 2023-05-11 RX ORDER — INSULIN ASPART 100 [IU]/ML
INJECTION, SOLUTION INTRAVENOUS; SUBCUTANEOUS
Qty: 33 ML | Refills: 1 | Status: SHIPPED | OUTPATIENT
Start: 2023-05-11

## 2023-05-11 RX ORDER — BLOOD-GLUCOSE SENSOR
1 EACH MISCELLANEOUS
Qty: 9 EACH | Refills: 3 | Status: SHIPPED | OUTPATIENT
Start: 2023-05-11

## 2023-05-11 RX ORDER — AMLODIPINE BESYLATE 10 MG/1
10 TABLET ORAL DAILY
Qty: 90 TABLET | Refills: 1 | Status: SHIPPED | OUTPATIENT
Start: 2023-05-11 | End: 2024-05-10

## 2023-05-11 RX ORDER — LISINOPRIL 40 MG/1
40 TABLET ORAL DAILY
Qty: 90 TABLET | Refills: 3 | Status: SHIPPED | OUTPATIENT
Start: 2023-05-11

## 2023-05-11 NOTE — PATIENT INSTRUCTIONS
If glucose is 200-250 +1 units with meals           251-300 +2 units with meals           301-350 +3 units with meals

## 2023-05-11 NOTE — PROGRESS NOTES
"     Office Note      Date: 2023  Patient Name: Raffy Pepe  MRN: [id number]  : 1965    Chief Complaint   Patient presents with   • Diabetes     Type II       History of Present Illness:   Raffy Pepe is a 58 y.o. male who presents for follow-up for type 2 diabetes diagnosed .  It has been about 6 months since his last appointment.  He reports he is not surprised his hemoglobin A1c is up some he has been forgetting to take his Basaglar at least once a week.  He reports when he does take his Basaglar he takes 48 to 50 units plus NovoLog 0 to 8 units with breakfast, 8 to 9 units with lunch and 8 to 9 units with supper.  He continues to use the Dexcom G6 continuous glucose monitor.  He is up-to-date on his eye exam he will be due for his appointment later this summer in July.  He denies any trouble with his feet today.  We did his foot exam at his appointment in November.  He reports life has been stressful recently his mom has been in the hospital and they are trying to rehab her so she can come home.  We added amlodipine 5 mg last visit.  He has been checking his blood pressure at home this is typically between 130-140/70-90 he remains on lisinopril 40 mg daily and hydrochlorothiazide 25 mg daily.      Subjective     Review of Systems:   Review of Systems   Constitutional: Negative.    Cardiovascular: Negative.    Gastrointestinal: Negative.    Endocrine: Negative.    Neurological: Negative.        The following portions of the patient's history were reviewed and updated as appropriate: allergies, current medications, past family history, past medical history, past social history, past surgical history and problem list.    Objective     Vitals:    23 0850   BP: 148/70   BP Location: Left arm   Patient Position: Sitting   Cuff Size: Adult   Pulse: 98   SpO2: 98%   Weight: 97.5 kg (215 lb)   Height: 177.8 cm (70\")   PainSc: 0-No pain     Body mass index is 30.85 kg/m².    Physical " Exam  Vitals reviewed.   Constitutional:       General: He is not in acute distress.     Appearance: Normal appearance.   Cardiovascular:      Pulses:           Dorsalis pedis pulses are 2+ on the right side and 2+ on the left side.        Posterior tibial pulses are 2+ on the right side and 2+ on the left side.   Musculoskeletal:      Right foot: No deformity.      Left foot: No deformity.   Feet:      Right foot:      Protective Sensation: 5 sites tested. 5 sites sensed.      Skin integrity: Skin integrity normal.      Toenail Condition: Right toenails are normal.      Left foot:      Protective Sensation: 5 sites tested. 5 sites sensed.      Skin integrity: Skin integrity normal.      Toenail Condition: Left toenails are normal.      Comments: Diabetic Foot Exam Performed and Monofilament Test Performed    Neurological:      Mental Status: He is alert.         HEMOGLOBIN A1C  Lab Results   Component Value Date    HGBA1C 9.6 05/11/2023       GLUCOSE  Glucose   Date Value Ref Range Status   05/11/2023 168 (A) 70 - 130 mg/dL Final       CMP  Lab Results   Component Value Date    GLUCOSE 132 (H) 11/01/2022    BUN 12 11/01/2022    CREATININE 0.78 11/01/2022    EGFRIFNONA 110 02/03/2022    BCR 15.4 11/01/2022    K 4.2 11/01/2022    CO2 29.9 (H) 11/01/2022    CALCIUM 10.0 11/01/2022    AST 20 11/01/2022    ALT 24 11/01/2022       LIPID PANEL  Lab Results   Component Value Date    CHOL 175 11/01/2022    CHLPL 207 (H) 03/25/2016    TRIG 122 11/01/2022    HDL 55 11/01/2022    LDL 98 11/01/2022       URINE MICROALBUMIN/CREATININE RATIO  Microalbumin/Creatinine Ratio   Date Value Ref Range Status   02/03/2022 42.4 mg/g Final       TSH  Lab Results   Component Value Date    TSH 1.220 02/03/2022       Assessment / Plan      Assessment & Plan:  1. Type 2 diabetes mellitus with hyperglycemia, with long-term current use of insulin  His hemoglobin A1c is higher than it was in November and well above goal at 9.6%.  We discussed the  importance of taking his insulin regularly.  I reviewed his Dexcom download and overall his blood sugars have been elevated.  He only has 24% in target range.  For now he will continue Basaglar 50 units daily and he will titrate this dose based on fasting glucose readings.  He will target fasting blood glucose 90 to 130 mg/dL.  He will send in blood sugars for review and adjustment if needed.  We also discussed adding an additional 1 unit for every 50 mg/dL his blood glucose is above 200 mg/dL to his NovoLog dose until his blood sugars settle down.  I refilled his NovoLog today.  We discussed the Dexcom G7 continuous glucose monitor.  He would like to try this.  I sent a prescription for this to his pharmacy.  His weight is up 1 pound since his appointment in November.  I encouraged healthy eating habits and physical activity as tolerated.  - POC Glucose, Blood  - POC Glycosylated Hemoglobin (Hb A1C)    2. Essential hypertension  His systolic blood pressure is up today.  We will increase his amlodipine to 10 mg daily.  I sent a new prescription for this dose to his pharmacy and advised him if he has several 5 mg tablets left he can take 2 tablets.  He will continue lisinopril 40 mg daily and hydrochlorothiazide 25 mg daily.  Patient to call as needed.      Return in about 3 months (around 8/11/2023) for Recheck 3-4 mos.     This note was dictated using Dragon voice recognition.    Missy Diaz PA-C  05/11/2023

## 2023-05-18 RX ORDER — AMLODIPINE BESYLATE 5 MG/1
TABLET ORAL
Qty: 90 TABLET | Refills: 1 | OUTPATIENT
Start: 2023-05-18

## 2023-08-09 RX ORDER — HYDROCHLOROTHIAZIDE 25 MG/1
TABLET ORAL
Qty: 90 TABLET | Refills: 2 | Status: SHIPPED | OUTPATIENT
Start: 2023-08-09

## 2023-08-09 NOTE — TELEPHONE ENCOUNTER
Rx Refill Note  Requested Prescriptions     Pending Prescriptions Disp Refills    hydroCHLOROthiazide (HYDRODIURIL) 25 MG tablet [Pharmacy Med Name: HYDROCHLOROTHIAZIDE 25 MG TAB] 90 tablet 2     Sig: TAKE 1 TABLET BY MOUTH EVERY DAY          Last office visit with prescribing clinician: 5/11/2023     Next office visit with prescribing clinician: 9/12/2023         Chiquita Allison MA  08/09/23, 09:28 EDT

## 2023-09-12 ENCOUNTER — OFFICE VISIT (OUTPATIENT)
Dept: ENDOCRINOLOGY | Facility: CLINIC | Age: 58
End: 2023-09-12
Payer: COMMERCIAL

## 2023-09-12 VITALS
WEIGHT: 212 LBS | DIASTOLIC BLOOD PRESSURE: 70 MMHG | HEIGHT: 70 IN | SYSTOLIC BLOOD PRESSURE: 138 MMHG | BODY MASS INDEX: 30.35 KG/M2 | OXYGEN SATURATION: 98 % | HEART RATE: 86 BPM

## 2023-09-12 DIAGNOSIS — E11.65 TYPE 2 DIABETES MELLITUS WITH HYPERGLYCEMIA, WITH LONG-TERM CURRENT USE OF INSULIN: Primary | ICD-10-CM

## 2023-09-12 DIAGNOSIS — Z79.4 TYPE 2 DIABETES MELLITUS WITH HYPERGLYCEMIA, WITH LONG-TERM CURRENT USE OF INSULIN: Primary | ICD-10-CM

## 2023-09-12 DIAGNOSIS — I10 ESSENTIAL HYPERTENSION: ICD-10-CM

## 2023-09-12 DIAGNOSIS — E78.2 MIXED HYPERLIPIDEMIA: ICD-10-CM

## 2023-09-12 LAB
EXPIRATION DATE: ABNORMAL
EXPIRATION DATE: NORMAL
GLUCOSE BLDC GLUCOMTR-MCNC: 158 MG/DL (ref 70–130)
HBA1C MFR BLD: 8.5 %
Lab: ABNORMAL
Lab: NORMAL

## 2023-09-12 PROCEDURE — 99214 OFFICE O/P EST MOD 30 MIN: CPT | Performed by: PHYSICIAN ASSISTANT

## 2023-09-12 PROCEDURE — 83036 HEMOGLOBIN GLYCOSYLATED A1C: CPT | Performed by: PHYSICIAN ASSISTANT

## 2023-09-12 PROCEDURE — 95251 CONT GLUC MNTR ANALYSIS I&R: CPT | Performed by: PHYSICIAN ASSISTANT

## 2023-09-12 RX ORDER — ACYCLOVIR 400 MG/1
TABLET ORAL
COMMUNITY

## 2023-09-12 NOTE — PROGRESS NOTES
Office Note      Date: 2023  Patient Name: Raffy Pepe  MRN: [id number]  : 1965    Chief Complaint   Patient presents with    Diabetes     Tpe II       History of Present Illness:   Raffy Pepe is a 58 y.o. male who presents for follow-up for type 2 diabetes diagnosed in .  He continues to use the Dexcom G6 continuous glucose monitor but will be switching over to the G7 sensor later this week.  We increased his Basaglar last visit and he is taking this more regularly.  He is taking 50 units daily.  He remains on NovoLog 0 to 8 units with breakfast, 8 to 9 units with lunch and supper.  He reports it was a stressful summer he was staying with his mom and caring for her most of the time.  He reports he had to move her into a nursing home when he started back to work and this has been an adjustment.  He reports the last 2 weeks his blood sugars have been better but the 2 weeks prior to this they were running high.  He reports he is getting back into a routine with work and he suspects things will improve.  He denies any severe or frequent hypoglycemia.  He reports he did have a blood sugar overnight in the s not too long ago.  He reports he was unable to go for his eye exam this summer because he was taking care of his mom.  He plans to get this scheduled during .  He denies any trouble with his feet today.  We did his foot exam at his appointment in May.  He will be due for fasting labs next visit.      Subjective     Review of Systems:   Review of Systems   Constitutional: Negative.    Cardiovascular: Negative.    Gastrointestinal: Negative.    Endocrine: Negative.    Neurological: Negative.      The following portions of the patient's history were reviewed and updated as appropriate: allergies, current medications, past family history, past medical history, past social history, past surgical history, and problem list.    Objective     Vitals:    23 1003   BP:  "138/70   BP Location: Left arm   Patient Position: Sitting   Cuff Size: Adult   Pulse: 86   SpO2: 98%   Weight: 96.2 kg (212 lb)   Height: 177.8 cm (70\")     Body mass index is 30.42 kg/m².    Physical Exam  Vitals reviewed.   Constitutional:       General: He is not in acute distress.     Appearance: Normal appearance.   Neurological:      Mental Status: He is alert.       HEMOGLOBIN A1C  Lab Results   Component Value Date    HGBA1C 8.5 09/12/2023       GLUCOSE  Glucose   Date Value Ref Range Status   09/12/2023 158 (A) 70 - 130 mg/dL Final       CMP  Lab Results   Component Value Date    GLUCOSE 132 (H) 11/01/2022    BUN 12 11/01/2022    CREATININE 0.78 11/01/2022    EGFRIFNONA 110 02/03/2022    BCR 15.4 11/01/2022    K 4.2 11/01/2022    CO2 29.9 (H) 11/01/2022    CALCIUM 10.0 11/01/2022    AST 20 11/01/2022    ALT 24 11/01/2022       LIPID PANEL  Lab Results   Component Value Date    CHOL 175 11/01/2022    CHLPL 207 (H) 03/25/2016    TRIG 122 11/01/2022    HDL 55 11/01/2022    LDL 98 11/01/2022       URINE MICROALBUMIN/CREATININE RATIO  Microalbumin/Creatinine Ratio   Date Value Ref Range Status   02/03/2022 42.4 mg/g Final       TSH  Lab Results   Component Value Date    TSH 1.220 02/03/2022       Assessment / Plan      Assessment & Plan:  1. Type 2 diabetes mellitus with hyperglycemia, with long-term current use of insulin  His hemoglobin A1c has improved as compared to May at 8.5%.  This is still above goal.  I reviewed his Dexcom download and overall his blood sugars recently have improved.  His estimated A1c for the last 2 weeks would be 7.4%.  He does have a spike after lunch and we discussed being more aggressive with his NovoLog with this meal.  Overall 60% of his blood glucose readings are in range with 34% high and 5% very high and less than 1% low or very low.  He will continue Basaglar 50 units daily and NovoLog 0 to 8 units with breakfast, 8 to 10 units with lunch and 8 to 9 units with supper.  His " weight is down 3 pounds since his appointment in May.  I encouraged healthy eating habits and physical activity as tolerated.  We will plan to do his fasting labs next visit for monitoring.  - POC Glucose, Blood  - POC Glycosylated Hemoglobin (Hb A1C)    2. Essential hypertension  His blood pressure is okay today and better than it was back in May.  He will continue his current medications.    3. Mixed hyperlipidemia  We will plan to do his fasting cholesterol next visit for monitoring.  For now he will continue atorvastatin 80 mg daily.      Return in about 4 months (around 1/12/2024).     This note was dictated using Dragon voice recognition.    Missy Diaz PA-C  09/12/2023

## 2024-01-25 ENCOUNTER — OFFICE VISIT (OUTPATIENT)
Dept: ENDOCRINOLOGY | Facility: CLINIC | Age: 59
End: 2024-01-25
Payer: COMMERCIAL

## 2024-01-25 VITALS
SYSTOLIC BLOOD PRESSURE: 128 MMHG | OXYGEN SATURATION: 95 % | HEART RATE: 102 BPM | DIASTOLIC BLOOD PRESSURE: 70 MMHG | HEIGHT: 70 IN | BODY MASS INDEX: 30.64 KG/M2 | WEIGHT: 214 LBS

## 2024-01-25 DIAGNOSIS — I10 ESSENTIAL HYPERTENSION: ICD-10-CM

## 2024-01-25 DIAGNOSIS — Z79.4 TYPE 2 DIABETES MELLITUS WITH HYPERGLYCEMIA, WITH LONG-TERM CURRENT USE OF INSULIN: Primary | ICD-10-CM

## 2024-01-25 DIAGNOSIS — E78.2 MIXED HYPERLIPIDEMIA: ICD-10-CM

## 2024-01-25 DIAGNOSIS — E11.65 TYPE 2 DIABETES MELLITUS WITH HYPERGLYCEMIA, WITH LONG-TERM CURRENT USE OF INSULIN: Primary | ICD-10-CM

## 2024-01-25 LAB
ALBUMIN SERPL-MCNC: 4.6 G/DL (ref 3.5–5.2)
ALBUMIN UR-MCNC: 3.5 MG/DL
ALBUMIN/GLOB SERPL: 1.6 G/DL
ALP SERPL-CCNC: 92 U/L (ref 39–117)
ALT SERPL W P-5'-P-CCNC: 22 U/L (ref 1–41)
ANION GAP SERPL CALCULATED.3IONS-SCNC: 13.1 MMOL/L (ref 5–15)
AST SERPL-CCNC: 19 U/L (ref 1–40)
BILIRUB SERPL-MCNC: 0.5 MG/DL (ref 0–1.2)
BUN SERPL-MCNC: 11 MG/DL (ref 8–23)
BUN/CREAT SERPL: 15.7 (ref 7–25)
CALCIUM SPEC-SCNC: 9.3 MG/DL (ref 8.6–10.5)
CHLORIDE SERPL-SCNC: 101 MMOL/L (ref 98–107)
CHOLEST SERPL-MCNC: 186 MG/DL (ref 0–200)
CO2 SERPL-SCNC: 27.9 MMOL/L (ref 22–29)
CREAT SERPL-MCNC: 0.7 MG/DL (ref 0.76–1.27)
CREAT UR-MCNC: 180.7 MG/DL
EGFRCR SERPLBLD CKD-EPI 2021: 105.5 ML/MIN/1.73
EXPIRATION DATE: ABNORMAL
EXPIRATION DATE: NORMAL
GLOBULIN UR ELPH-MCNC: 2.9 GM/DL
GLUCOSE BLDC GLUCOMTR-MCNC: 130 MG/DL (ref 70–130)
GLUCOSE SERPL-MCNC: 123 MG/DL (ref 65–99)
HBA1C MFR BLD: 8.2 % (ref 4.5–5.7)
HDLC SERPL-MCNC: 63 MG/DL (ref 40–60)
LDLC SERPL CALC-MCNC: 106 MG/DL (ref 0–100)
LDLC/HDLC SERPL: 1.66 {RATIO}
Lab: ABNORMAL
Lab: NORMAL
MICROALBUMIN/CREAT UR: 19.4 MG/G (ref 0–29)
POTASSIUM SERPL-SCNC: 3.5 MMOL/L (ref 3.5–5.2)
PROT SERPL-MCNC: 7.5 G/DL (ref 6–8.5)
SODIUM SERPL-SCNC: 142 MMOL/L (ref 136–145)
TRIGL SERPL-MCNC: 92 MG/DL (ref 0–150)
TSH SERPL DL<=0.05 MIU/L-ACNC: 1.42 UIU/ML (ref 0.27–4.2)
VLDLC SERPL-MCNC: 17 MG/DL (ref 5–40)

## 2024-01-25 PROCEDURE — 82570 ASSAY OF URINE CREATININE: CPT | Performed by: PHYSICIAN ASSISTANT

## 2024-01-25 PROCEDURE — 99214 OFFICE O/P EST MOD 30 MIN: CPT | Performed by: PHYSICIAN ASSISTANT

## 2024-01-25 PROCEDURE — 80061 LIPID PANEL: CPT | Performed by: PHYSICIAN ASSISTANT

## 2024-01-25 PROCEDURE — 82043 UR ALBUMIN QUANTITATIVE: CPT | Performed by: PHYSICIAN ASSISTANT

## 2024-01-25 PROCEDURE — 80053 COMPREHEN METABOLIC PANEL: CPT | Performed by: PHYSICIAN ASSISTANT

## 2024-01-25 PROCEDURE — 95251 CONT GLUC MNTR ANALYSIS I&R: CPT | Performed by: PHYSICIAN ASSISTANT

## 2024-01-25 PROCEDURE — 83036 HEMOGLOBIN GLYCOSYLATED A1C: CPT | Performed by: PHYSICIAN ASSISTANT

## 2024-01-25 PROCEDURE — 84443 ASSAY THYROID STIM HORMONE: CPT | Performed by: PHYSICIAN ASSISTANT

## 2024-01-25 NOTE — PROGRESS NOTES
"     Office Note      Date: 2024  Patient Name: Raffy Pepe  MRN: [id number]  : 1965    Chief Complaint   Patient presents with    Diabetes     Type 2 diabetes mellitus with hyperglycemia, with long-term current use of insulin         History of Present Illness:   Raffy Pepe is a 58 y.o. male who presents for follow-up for type 2 diabetes diagnosed in .  He remains on Basaglar 46 to 50 units daily and NovoLog 0 to 8 units with breakfast, 8 to 10 units with lunch and 8 to 9 units with supper.  He reports recently he has been taking 46 to 48 units of his Basaglar.  He continues to use the Dexcom continuous glucose monitor he switched over to the G7 since last visit and likes this device.  He reports overall his blood sugars have been okay he denies severe or frequent hypoglycemia.  He is due for his eye exam he has had to reschedule this twice since his last visit and has an appointment early next month.  He denies any trouble with his feet today.  We did his foot exam at his appointment in May.  He is fasting for labs today.  He remains on atorvastatin 80 mg daily.  His mom and mother-in-law passed away since last visit.      Subjective     Review of Systems:   Review of Systems   Constitutional: Negative.    Cardiovascular: Negative.    Gastrointestinal: Negative.    Endocrine: Negative.    Neurological: Negative.        The following portions of the patient's history were reviewed and updated as appropriate: allergies, current medications, past family history, past medical history, past social history, past surgical history, and problem list.    Objective     Vitals:    24 0945   BP: 128/70   BP Location: Right arm   Patient Position: Sitting   Cuff Size: Adult   Pulse: 102   SpO2: 95%   Weight: 97.1 kg (214 lb)   Height: 177.8 cm (70\")     Body mass index is 30.71 kg/m².    Physical Exam  Vitals reviewed.   Constitutional:       General: He is not in acute distress.     " Appearance: Normal appearance.   Neurological:      Mental Status: He is alert.         HEMOGLOBIN A1C  Lab Results   Component Value Date    HGBA1C 8.2 (A) 01/25/2024       GLUCOSE  Glucose   Date Value Ref Range Status   01/25/2024 130 70 - 130 mg/dL Final       CMP  Lab Results   Component Value Date    GLUCOSE 132 (H) 11/01/2022    BUN 12 11/01/2022    CREATININE 0.78 11/01/2022    EGFRIFNONA 110 02/03/2022    BCR 15.4 11/01/2022    K 4.2 11/01/2022    CO2 29.9 (H) 11/01/2022    CALCIUM 10.0 11/01/2022    AST 20 11/01/2022    ALT 24 11/01/2022       LIPID PANEL  Lab Results   Component Value Date    CHOL 175 11/01/2022    CHLPL 207 (H) 03/25/2016    TRIG 122 11/01/2022    HDL 55 11/01/2022    LDL 98 11/01/2022       URINE MICROALBUMIN/CREATININE RATIO  Microalbumin/Creatinine Ratio   Date Value Ref Range Status   02/03/2022 42.4 mg/g Final       TSH  Lab Results   Component Value Date    TSH 1.220 02/03/2022       Assessment / Plan      Assessment & Plan:  1. Type 2 diabetes mellitus with hyperglycemia, with long-term current use of insulin  His hemoglobin A1c has improved slightly as compared to September at 8.2%.  This is still above goal.  I reviewed his Dexcom download his average glucose recently has been 180 mg/dL with 56% within range, 35% high, 7% very high 1% low and less than 1% very low.  His fasting readings are around 150 mg/dL.  We discussed increasing his Basaglar to 50 units regularly to try to improve these numbers.  He will send in blood sugar readings for review and adjustment as needed.  His weight is up 2 pounds since his appointment in September.  I encouraged healthy eating habits and physical activity as tolerated.  Fasting labs pending today.  Will send note with results and plan.  - POC Glycosylated Hemoglobin (Hb A1C)  - POC Glucose, Blood  - TSH; Future  - Comprehensive Metabolic Panel; Future  - Microalbumin / Creatinine Urine Ratio - Urine, Clean Catch; Future  - Lipid Panel;  Future    2. Essential hypertension  His blood pressure is okay today.  He will continue his lisinopril, hydrochlorothiazide and amlodipine..    3. Mixed hyperlipidemia  Fasting labs pending today.  Will send note with results and plan.  For now he will continue atorvastatin 80 mg daily.  - Lipid Panel; Future      Return in about 4 months (around 5/25/2024).     This note was dictated using Dragon voice recognition.    Missy Diaz PA-C  01/25/2024

## 2024-02-06 RX ORDER — AMLODIPINE BESYLATE 10 MG/1
10 TABLET ORAL DAILY
Qty: 90 TABLET | Refills: 1 | Status: SHIPPED | OUTPATIENT
Start: 2024-02-06 | End: 2025-02-05

## 2024-02-06 NOTE — TELEPHONE ENCOUNTER
Rx Refill Note  Requested Prescriptions     Pending Prescriptions Disp Refills    amLODIPine (NORVASC) 10 MG tablet 90 tablet 1     Sig: Take 1 tablet by mouth Daily.      Last office visit with prescribing clinician: 1/25/2024     Next office visit with prescribing clinician: 5/28/2024

## 2024-02-27 RX ORDER — ACYCLOVIR 400 MG/1
1 TABLET ORAL
Qty: 9 EACH | Refills: 1 | Status: SHIPPED | OUTPATIENT
Start: 2024-02-27

## 2024-02-27 NOTE — TELEPHONE ENCOUNTER
Rx Refill Note  Requested Prescriptions     Pending Prescriptions Disp Refills    Continuous Blood Gluc Sensor (Dexcom G7 Sensor) misc 9 each 3     Sig: Use 1 each Every 10 (Ten) Days.      Last office visit with prescribing clinician: 1/25/2024     Next office visit with prescribing clinician: 5/28/2024

## 2024-03-07 ENCOUNTER — TELEPHONE (OUTPATIENT)
Dept: ENDOCRINOLOGY | Facility: CLINIC | Age: 59
End: 2024-03-07
Payer: COMMERCIAL

## 2024-03-07 NOTE — TELEPHONE ENCOUNTER
PT'S WIFE CALLED STATING [facility] IN [place], [place] DID NOT RECEIVE RX FOR DEXCOM G7 SENSORS. SHE REQUESTED WE RESEND THE RX.

## 2024-03-07 NOTE — TELEPHONE ENCOUNTER
Patients wife called again. Stated she called the pharmacy again and they are needing a PA done on this rx. I asked her to reach back out to the pharmacy and have them refax us the insurance denial. Please advise.

## 2024-03-08 ENCOUNTER — PRIOR AUTHORIZATION (OUTPATIENT)
Dept: ENDOCRINOLOGY | Facility: CLINIC | Age: 59
End: 2024-03-08
Payer: COMMERCIAL

## 2024-03-08 RX ORDER — ACYCLOVIR 400 MG/1
1 TABLET ORAL
Qty: 9 EACH | Refills: 1 | OUTPATIENT
Start: 2024-03-08

## 2024-03-08 NOTE — TELEPHONE ENCOUNTER
Rx Refill Note  Requested Prescriptions     Pending Prescriptions Disp Refills    Continuous Blood Gluc Sensor (Dexcom G7 Sensor) misc 9 each 1     Sig: Use 1 each Every 10 (Ten) Days.      Last office visit with prescribing clinician: 1/25/2024   Last telemedicine visit with prescribing clinician: Visit date not found   Next office visit with prescribing clinician: 3/7/2024                         Would you like a call back once the refill request has been completed: [] Yes [] No    If the office needs to give you a call back, can they leave a voicemail: [] Yes [] No    Liv Rizo MA  03/08/24, 13:35 EST

## 2024-03-08 NOTE — TELEPHONE ENCOUNTER
BRITT CABRERA (Key: [id number])  PA Case ID #: [id number]  Rx #: [id number]  Need Help? Call us at [phone]  Outcome  Approved on March 7  Your PA request has been approved. Additional information will be provided in the approval communication. (Message 114)  Authorization Expiration Date: 3/7/2025  Drug  Dexcom G7 Sensor  ePA cloud logo  Form  [employer] Electronic PA Form (2017 [employer]

## 2024-05-28 ENCOUNTER — OFFICE VISIT (OUTPATIENT)
Dept: ENDOCRINOLOGY | Facility: CLINIC | Age: 59
End: 2024-05-28
Payer: COMMERCIAL

## 2024-05-28 VITALS
BODY MASS INDEX: 30.49 KG/M2 | SYSTOLIC BLOOD PRESSURE: 124 MMHG | HEIGHT: 70 IN | OXYGEN SATURATION: 95 % | WEIGHT: 213 LBS | DIASTOLIC BLOOD PRESSURE: 75 MMHG | HEART RATE: 103 BPM

## 2024-05-28 DIAGNOSIS — Z79.4 TYPE 2 DIABETES MELLITUS WITH HYPERGLYCEMIA, WITH LONG-TERM CURRENT USE OF INSULIN: Primary | ICD-10-CM

## 2024-05-28 DIAGNOSIS — E11.65 TYPE 2 DIABETES MELLITUS WITH HYPERGLYCEMIA, WITH LONG-TERM CURRENT USE OF INSULIN: Primary | ICD-10-CM

## 2024-05-28 DIAGNOSIS — I10 ESSENTIAL HYPERTENSION: ICD-10-CM

## 2024-05-28 DIAGNOSIS — E78.2 MIXED HYPERLIPIDEMIA: ICD-10-CM

## 2024-05-28 LAB
EXPIRATION DATE: ABNORMAL
EXPIRATION DATE: ABNORMAL
GLUCOSE BLDC GLUCOMTR-MCNC: 174 MG/DL (ref 70–130)
HBA1C MFR BLD: 8.7 % (ref 4.5–5.7)
Lab: ABNORMAL
Lab: ABNORMAL

## 2024-05-28 PROCEDURE — 99214 OFFICE O/P EST MOD 30 MIN: CPT | Performed by: PHYSICIAN ASSISTANT

## 2024-05-28 PROCEDURE — 95251 CONT GLUC MNTR ANALYSIS I&R: CPT | Performed by: PHYSICIAN ASSISTANT

## 2024-05-28 PROCEDURE — 83036 HEMOGLOBIN GLYCOSYLATED A1C: CPT | Performed by: PHYSICIAN ASSISTANT

## 2024-05-28 RX ORDER — ACYCLOVIR 400 MG/1
1 TABLET ORAL
Qty: 9 EACH | Refills: 3 | Status: SHIPPED | OUTPATIENT
Start: 2024-05-28

## 2024-05-28 NOTE — PATIENT INSTRUCTIONS
Inject 10 units with lunch and supper plus 1 unit for every 50 mg/dl blood glucose is above 150 mg/dl  150-199 +1  200-249 +2  250-299 +3  300-349 +4  > 350  +5

## 2024-05-28 NOTE — PROGRESS NOTES
"     Office Note      Date: 2024  Patient Name: Raffy Pepe  MRN: [id number]  : 1965    Chief Complaint   Patient presents with    Diabetes       History of Present Illness:   Raffy Pepe is a 59 y.o. male who presents for follow-up for type 2 diabetes diagnosed in .  He remains on Basaglar 50 units daily and NovoLog 0 to 8 units with breakfast up to 10 units with lunch and up to 10 units with supper.  He continues to use the Dexcom G7 continuous glucose monitor.  He reports he had a GI bug back in February but otherwise has been doing well.  He is surprised his A1c is up so much today.  He denies any severe or frequent hypoglycemia but does have an occasional low blood sugar.  He is up-to-date on his exam he had his appointments May 23 and all was okay.  He denies any trouble with his feet today.  We did his fasting labs in January.  His LDL cholesterol was above goal but he occasionally misses some doses of his atorvastatin.      Subjective     Review of Systems:   Review of Systems   Constitutional: Negative.    Cardiovascular: Negative.    Gastrointestinal: Negative.    Endocrine: Negative.    Neurological: Negative.        The following portions of the patient's history were reviewed and updated as appropriate: allergies, current medications, past family history, past medical history, past social history, past surgical history, and problem list.    Objective     Vitals:    24 0904   BP: 124/75   Pulse: 103   SpO2: 95%   Weight: 96.6 kg (213 lb)   Height: 177.8 cm (70\")     Body mass index is 30.56 kg/m².    Physical Exam  Vitals reviewed.   Constitutional:       General: He is not in acute distress.     Appearance: Normal appearance.   Cardiovascular:      Pulses:           Dorsalis pedis pulses are 1+ on the right side and 1+ on the left side.        Posterior tibial pulses are 1+ on the right side and 1+ on the left side.   Musculoskeletal:      Right foot: No deformity.    "   Left foot: No deformity.   Feet:      Right foot:      Protective Sensation: 5 sites tested.  5 sites sensed.      Skin integrity: Skin integrity normal.      Toenail Condition: Right toenails are normal.      Left foot:      Protective Sensation: 5 sites tested.  5 sites sensed.      Skin integrity: Skin integrity normal.      Toenail Condition: Left toenails are normal.      Comments: Diabetic Foot Exam Performed and Monofilament Test Performed      Neurological:      Mental Status: He is alert.         HEMOGLOBIN A1C  Lab Results   Component Value Date    HGBA1C 8.7 (A) 05/28/2024       GLUCOSE  Glucose   Date Value Ref Range Status   05/28/2024 174 (A) 70 - 130 mg/dL Final       CMP  Lab Results   Component Value Date    GLUCOSE 123 (H) 01/25/2024    BUN 11 01/25/2024    CREATININE 0.70 (L) 01/25/2024    EGFRIFNONA 110 02/03/2022    BCR 15.7 01/25/2024    K 3.5 01/25/2024    CO2 27.9 01/25/2024    CALCIUM 9.3 01/25/2024    AST 19 01/25/2024    ALT 22 01/25/2024       LIPID PANEL  Lab Results   Component Value Date    CHOL 186 01/25/2024    CHLPL 207 (H) 03/25/2016    TRIG 92 01/25/2024    HDL 63 (H) 01/25/2024     (H) 01/25/2024       URINE MICROALBUMIN/CREATININE RATIO  Microalbumin/Creatinine Ratio   Date Value Ref Range Status   01/25/2024 19.4 0.0 - 29.0 mg/g Final       TSH  Lab Results   Component Value Date    TSH 1.420 01/25/2024       Assessment / Plan      Assessment & Plan:  1. Type 2 diabetes mellitus with hyperglycemia, with long-term current use of insulin  His hemoglobin A1c is up as compared to January and above goal at 8.7%.  I reviewed his Dexcom download and his average glucose is 172 mg/dL with 59% of his readings within range, 30% high, 9% very high, 1% low and less than 1% very low.  His blood sugars do tend to spike during the day.  For now we will continue Basaglar 15 units daily and he will do NovoLog 10 units with meals plus an additional 1 unit for every 50 mg/dL his blood  glucose is above 150 mg/dL at lunch and supper.  He will send in blood sugars for review and adjustment if needed.  I refilled his Dexcom G7 sensors today.  His weight is down 1 pound since his appointment in January.  I encouraged healthy eating habits and physical activity as tolerated.    - POC Glucose, Blood  - POC Glycosylated Hemoglobin (Hb A1C)    2. Essential hypertension  His blood pressure is okay today.  He will continue his lisinopril and amlodipine.    3. Mixed hyperlipidemia  We did fasting labs last visit.  His cholesterol numbers were slightly above goal.  We discussed the importance of taking his atorvastatin 80 mg regularly.  Patient to call as needed.      Return in about 4 months (around 9/28/2024) for Recheck.     This note was dictated using Dragon voice recognition.    Electronically signed by: JUAN RAMON Arnold  05/28/2024

## 2024-08-05 RX ORDER — LISINOPRIL 40 MG/1
40 TABLET ORAL DAILY
Qty: 90 TABLET | Refills: 0 | Status: SHIPPED | OUTPATIENT
Start: 2024-08-05

## 2024-08-05 RX ORDER — AMLODIPINE BESYLATE 10 MG/1
10 TABLET ORAL DAILY
Qty: 90 TABLET | Refills: 0 | Status: SHIPPED | OUTPATIENT
Start: 2024-08-05

## 2024-08-05 NOTE — TELEPHONE ENCOUNTER
Rx Refill Note  Requested Prescriptions     Pending Prescriptions Disp Refills    lisinopril (PRINIVIL,ZESTRIL) 40 MG tablet [Pharmacy Med Name: LISINOPRIL 40 MG TABLET] 90 tablet 3     Sig: TAKE 1 TABLET BY MOUTH EVERY DAY    amLODIPine (NORVASC) 10 MG tablet [Pharmacy Med Name: AMLODIPINE BESYLATE 10 MG TAB] 90 tablet 1     Sig: TAKE 1 TABLET BY MOUTH EVERY DAY      Last office visit with prescribing clinician: 5/28/2024     Next office visit with prescribing clinician: 10/1/2024

## 2024-10-01 ENCOUNTER — OFFICE VISIT (OUTPATIENT)
Dept: ENDOCRINOLOGY | Facility: CLINIC | Age: 59
End: 2024-10-01
Payer: COMMERCIAL

## 2024-10-01 VITALS
OXYGEN SATURATION: 95 % | SYSTOLIC BLOOD PRESSURE: 120 MMHG | BODY MASS INDEX: 30.49 KG/M2 | DIASTOLIC BLOOD PRESSURE: 74 MMHG | WEIGHT: 213 LBS | HEART RATE: 110 BPM | HEIGHT: 70 IN

## 2024-10-01 DIAGNOSIS — E11.65 TYPE 2 DIABETES MELLITUS WITH HYPERGLYCEMIA, WITH LONG-TERM CURRENT USE OF INSULIN: Primary | ICD-10-CM

## 2024-10-01 DIAGNOSIS — Z79.4 TYPE 2 DIABETES MELLITUS WITH HYPERGLYCEMIA, WITH LONG-TERM CURRENT USE OF INSULIN: Primary | ICD-10-CM

## 2024-10-01 DIAGNOSIS — E78.2 MIXED HYPERLIPIDEMIA: ICD-10-CM

## 2024-10-01 DIAGNOSIS — I10 ESSENTIAL HYPERTENSION: ICD-10-CM

## 2024-10-01 LAB
EXPIRATION DATE: ABNORMAL
EXPIRATION DATE: NORMAL
GLUCOSE BLDC GLUCOMTR-MCNC: 119 MG/DL (ref 70–130)
HBA1C MFR BLD: 8.5 % (ref 4.5–5.7)
Lab: ABNORMAL
Lab: NORMAL

## 2024-10-01 PROCEDURE — 99214 OFFICE O/P EST MOD 30 MIN: CPT | Performed by: PHYSICIAN ASSISTANT

## 2024-10-01 PROCEDURE — 95251 CONT GLUC MNTR ANALYSIS I&R: CPT | Performed by: PHYSICIAN ASSISTANT

## 2024-10-01 PROCEDURE — 83036 HEMOGLOBIN GLYCOSYLATED A1C: CPT | Performed by: PHYSICIAN ASSISTANT

## 2024-10-01 RX ORDER — ATORVASTATIN CALCIUM 80 MG/1
80 TABLET, FILM COATED ORAL DAILY
Qty: 90 TABLET | Refills: 2 | Status: SHIPPED | OUTPATIENT
Start: 2024-10-01

## 2024-10-01 RX ORDER — LISINOPRIL 40 MG/1
40 TABLET ORAL DAILY
Qty: 90 TABLET | Refills: 2 | Status: SHIPPED | OUTPATIENT
Start: 2024-10-01

## 2024-10-01 RX ORDER — AMLODIPINE BESYLATE 10 MG/1
10 TABLET ORAL DAILY
Qty: 90 TABLET | Refills: 2 | Status: SHIPPED | OUTPATIENT
Start: 2024-10-01

## 2024-10-01 RX ORDER — INSULIN ASPART 100 [IU]/ML
INJECTION, SOLUTION INTRAVENOUS; SUBCUTANEOUS
Qty: 33 ML | Refills: 1 | Status: SHIPPED | OUTPATIENT
Start: 2024-10-01

## 2024-10-01 RX ORDER — INSULIN GLARGINE 100 [IU]/ML
INJECTION, SOLUTION SUBCUTANEOUS
Qty: 54 ML | Refills: 2 | Status: SHIPPED | OUTPATIENT
Start: 2024-10-01

## 2024-10-01 RX ORDER — HYDROCHLOROTHIAZIDE 25 MG/1
25 TABLET ORAL DAILY
Qty: 90 TABLET | Refills: 2 | Status: SHIPPED | OUTPATIENT
Start: 2024-10-01

## 2024-10-01 NOTE — PROGRESS NOTES
"     Office Note      Date: 10/01/2024  Patient Name: Raffy Pepe  MRN: [id number]  : 1965    Chief Complaint   Patient presents with    Diabetes       History of Present Illness:   Raffy Pepe is a 59 y.o. male who presents for follow-up for type 2 diabetes diagnosed in .  He remains on Basaglar 50 units daily and NovoLog 0 to 8 units with breakfast, 10 units with lunch and supper plus an additional 1 unit for every 50 mg/dL his blood glucose is above 150 mg/dL.  He reports he is doing the correction and this seems to be working but he often does not take his NovoLog until after he is eaten and his blood glucose is already elevated.  He continues to use the Dexcom G7 continuous glucose monitor.  He is up-to-date on his eye exam he had his appointment in May.  He will be due for fasting labs next visit.  He denies any trouble with his feet today.  We checked his feet as his appointment in May.      Subjective     Review of Systems:   Review of Systems   Constitutional: Negative.    Cardiovascular: Negative.    Gastrointestinal: Negative.    Endocrine: Negative.    Neurological: Negative.        The following portions of the patient's history were reviewed and updated as appropriate: allergies, current medications, past family history, past medical history, past social history, past surgical history, and problem list.    Objective     Vitals:    10/01/24 1118   BP: 120/74   Pulse: 110   SpO2: 95%   Weight: 96.6 kg (213 lb)   Height: 177.8 cm (70\")     Body mass index is 30.56 kg/m².    Physical Exam  Vitals reviewed.   Constitutional:       General: He is not in acute distress.     Appearance: Normal appearance.   Neurological:      Mental Status: He is alert.         HEMOGLOBIN A1C  Lab Results   Component Value Date    HGBA1C 8.5 (A) 10/01/2024       GLUCOSE  Glucose   Date Value Ref Range Status   10/01/2024 119 70 - 130 mg/dL Final       CMP  Lab Results   Component Value Date    GLUCOSE " 123 (H) 01/25/2024    BUN 11 01/25/2024    CREATININE 0.70 (L) 01/25/2024    EGFRIFNONA 110 02/03/2022    BCR 15.7 01/25/2024    K 3.5 01/25/2024    CO2 27.9 01/25/2024    CALCIUM 9.3 01/25/2024    AST 19 01/25/2024    ALT 22 01/25/2024       LIPID PANEL  Lab Results   Component Value Date    CHOL 186 01/25/2024    CHLPL 207 (H) 03/25/2016    TRIG 92 01/25/2024    HDL 63 (H) 01/25/2024     (H) 01/25/2024       URINE MICROALBUMIN/CREATININE RATIO  Microalbumin/Creatinine Ratio   Date Value Ref Range Status   01/25/2024 19.4 0.0 - 29.0 mg/g Final       TSH  Lab Results   Component Value Date    TSH 1.420 01/25/2024       Assessment / Plan      Assessment & Plan:  1. Type 2 diabetes mellitus with hyperglycemia, with long-term current use of insulin  His hemoglobin A1c has improved slightly as compared to May but is still above goal at 8.5%.  I reviewed his Dexcom download his average glucose for the last 2 weeks has been 172 mg/dL with 60% of his readings within range, 33% high, 7% very high, 0% low and less than 1% very low.  We discussed increasing his insulin with breakfast and lunch to try to improve hyperglycemia but he will work on taking this prior to eating first.  He will send in blood sugar readings for review and adjustment if needed.  His weight is stable.  I encouraged healthy eating habits and physical activity as tolerated.  I refilled his insulins today.  - POC Glucose, Blood  - POC Glycosylated Hemoglobin (Hb A1C)    2. Essential hypertension  His blood pressure is okay today.  He will continue his current medications.  I refilled his lisinopril amlodipine and hydrochlorothiazide today.    3. Mixed hyperlipidemia  He will be due for fasting labs next visit for monitoring.  I refilled his atorvastatin today.      Return in about 4 months (around 2/1/2025) for Recheck, fasting .     This note was dictated using Dragon voice recognition.    Electronically signed by: Missy Diaz  PA  10/01/2024

## 2025-02-03 RX ORDER — PEN NEEDLE, DIABETIC 32GX 5/32"
1 NEEDLE, DISPOSABLE MISCELLANEOUS 4 TIMES DAILY
Qty: 400 EACH | Refills: 1 | Status: SHIPPED | OUTPATIENT
Start: 2025-02-03

## 2025-02-06 ENCOUNTER — OFFICE VISIT (OUTPATIENT)
Dept: ENDOCRINOLOGY | Facility: CLINIC | Age: 60
End: 2025-02-06
Payer: COMMERCIAL

## 2025-02-06 VITALS
BODY MASS INDEX: 30.49 KG/M2 | OXYGEN SATURATION: 95 % | SYSTOLIC BLOOD PRESSURE: 114 MMHG | WEIGHT: 213 LBS | DIASTOLIC BLOOD PRESSURE: 78 MMHG | HEART RATE: 93 BPM | HEIGHT: 70 IN

## 2025-02-06 DIAGNOSIS — E11.65 TYPE 2 DIABETES MELLITUS WITH HYPERGLYCEMIA, WITH LONG-TERM CURRENT USE OF INSULIN: Primary | ICD-10-CM

## 2025-02-06 DIAGNOSIS — I10 ESSENTIAL HYPERTENSION: ICD-10-CM

## 2025-02-06 DIAGNOSIS — E78.2 MIXED HYPERLIPIDEMIA: ICD-10-CM

## 2025-02-06 DIAGNOSIS — Z79.4 TYPE 2 DIABETES MELLITUS WITH HYPERGLYCEMIA, WITH LONG-TERM CURRENT USE OF INSULIN: Primary | ICD-10-CM

## 2025-02-06 LAB
ALBUMIN SERPL-MCNC: 4 G/DL (ref 3.5–5.2)
ALBUMIN UR-MCNC: 1.7 MG/DL
ALBUMIN/GLOB SERPL: 1.3 G/DL
ALP SERPL-CCNC: 81 U/L (ref 39–117)
ALT SERPL W P-5'-P-CCNC: 24 U/L (ref 1–41)
ANION GAP SERPL CALCULATED.3IONS-SCNC: 13.2 MMOL/L (ref 5–15)
AST SERPL-CCNC: 19 U/L (ref 1–40)
BILIRUB SERPL-MCNC: 0.6 MG/DL (ref 0–1.2)
BUN SERPL-MCNC: 11 MG/DL (ref 8–23)
BUN/CREAT SERPL: 15.5 (ref 7–25)
CALCIUM SPEC-SCNC: 9.2 MG/DL (ref 8.6–10.5)
CHLORIDE SERPL-SCNC: 101 MMOL/L (ref 98–107)
CHOLEST SERPL-MCNC: 154 MG/DL (ref 0–200)
CO2 SERPL-SCNC: 25.8 MMOL/L (ref 22–29)
CREAT SERPL-MCNC: 0.71 MG/DL (ref 0.76–1.27)
CREAT UR-MCNC: 169 MG/DL
EGFRCR SERPLBLD CKD-EPI 2021: 104.4 ML/MIN/1.73
EXPIRATION DATE: ABNORMAL
EXPIRATION DATE: ABNORMAL
GLOBULIN UR ELPH-MCNC: 3 GM/DL
GLUCOSE BLDC GLUCOMTR-MCNC: 161 MG/DL (ref 70–130)
GLUCOSE SERPL-MCNC: 129 MG/DL (ref 65–99)
HBA1C MFR BLD: 8.9 % (ref 4.5–5.7)
HDLC SERPL-MCNC: 52 MG/DL (ref 40–60)
LDLC SERPL CALC-MCNC: 85 MG/DL (ref 0–100)
LDLC/HDLC SERPL: 1.62 {RATIO}
Lab: ABNORMAL
Lab: ABNORMAL
MICROALBUMIN/CREAT UR: 10.1 MG/G (ref 0–29)
POTASSIUM SERPL-SCNC: 3.5 MMOL/L (ref 3.5–5.2)
PROT SERPL-MCNC: 7 G/DL (ref 6–8.5)
SODIUM SERPL-SCNC: 140 MMOL/L (ref 136–145)
TRIGL SERPL-MCNC: 88 MG/DL (ref 0–150)
TSH SERPL DL<=0.05 MIU/L-ACNC: 1.18 UIU/ML (ref 0.27–4.2)
VLDLC SERPL-MCNC: 17 MG/DL (ref 5–40)

## 2025-02-06 PROCEDURE — 82043 UR ALBUMIN QUANTITATIVE: CPT | Performed by: PHYSICIAN ASSISTANT

## 2025-02-06 PROCEDURE — 80061 LIPID PANEL: CPT | Performed by: PHYSICIAN ASSISTANT

## 2025-02-06 PROCEDURE — 82570 ASSAY OF URINE CREATININE: CPT | Performed by: PHYSICIAN ASSISTANT

## 2025-02-06 PROCEDURE — 80053 COMPREHEN METABOLIC PANEL: CPT | Performed by: PHYSICIAN ASSISTANT

## 2025-02-06 PROCEDURE — 84443 ASSAY THYROID STIM HORMONE: CPT | Performed by: PHYSICIAN ASSISTANT

## 2025-02-06 RX ORDER — AMLODIPINE BESYLATE 10 MG/1
10 TABLET ORAL DAILY
Qty: 90 TABLET | Refills: 2 | Status: SHIPPED | OUTPATIENT
Start: 2025-02-06

## 2025-02-06 RX ORDER — INSULIN DEGLUDEC 200 U/ML
50 INJECTION, SOLUTION SUBCUTANEOUS DAILY
Qty: 24 ML | Refills: 2 | Status: SHIPPED | OUTPATIENT
Start: 2025-02-06

## 2025-02-06 RX ORDER — INSULIN ASPART 100 [IU]/ML
INJECTION, SOLUTION INTRAVENOUS; SUBCUTANEOUS
Qty: 33 ML | Refills: 2 | Status: SHIPPED | OUTPATIENT
Start: 2025-02-06

## 2025-02-06 NOTE — PATIENT INSTRUCTIONS
Basaglar switch to Tresiba 50 units daily  Novolog with breakfast 5-10 units, lunch 11 units, supper 11 units  Plus 1 unit for every 50 mg/dl blood glucose is above 150 mg/dl

## 2025-03-20 ENCOUNTER — PRIOR AUTHORIZATION (OUTPATIENT)
Dept: ENDOCRINOLOGY | Facility: CLINIC | Age: 60
End: 2025-03-20
Payer: COMMERCIAL

## 2025-03-20 NOTE — TELEPHONE ENCOUNTER
BRITT CABRERA (Key: [id number])  PA Case ID #: [id number]  Rx #: [id number]  Need Help? Call us at [phone]  Outcome  [facility]  Your PA request has been approved. Additional information will be provided in the approval communication. (Message 1147)  Effective Date: 3/20/2025  Authorization Expiration Date: 3/20/2026  Drug  Dexcom G7 Sensor  ePA cloud logo  Form  [employer] Electronic PA Form (2017 [employer])  Original Claim Info

## 2025-06-19 RX ORDER — ACYCLOVIR 400 MG/1
1 TABLET ORAL
Qty: 9 EACH | Refills: 0 | Status: SHIPPED | OUTPATIENT
Start: 2025-06-19

## 2025-06-26 RX ORDER — ATORVASTATIN CALCIUM 80 MG/1
80 TABLET, FILM COATED ORAL DAILY
Qty: 90 TABLET | Refills: 1 | Status: SHIPPED | OUTPATIENT
Start: 2025-06-26

## 2025-06-26 RX ORDER — HYDROCHLOROTHIAZIDE 25 MG/1
25 TABLET ORAL DAILY
Qty: 90 TABLET | Refills: 1 | Status: SHIPPED | OUTPATIENT
Start: 2025-06-26

## 2025-06-26 NOTE — TELEPHONE ENCOUNTER
Rx Refill Note  Requested Prescriptions     Pending Prescriptions Disp Refills    atorvastatin (LIPITOR) 80 MG tablet [Pharmacy Med Name: ATORVASTATIN 80 MG TABLET] 90 tablet 0     Sig: TAKE 1 TABLET BY MOUTH EVERY DAY    hydroCHLOROthiazide 25 MG tablet [Pharmacy Med Name: HYDROCHLOROTHIAZIDE 25 MG TAB] 90 tablet 0     Sig: TAKE 1 TABLET BY MOUTH EVERY DAY      Last office visit with prescribing clinician: 2/6/2025     Next office visit with prescribing clinician: 10/9/2025     Beverley Wharton MA  06/26/25, 08:54 EDT

## 2025-08-06 RX ORDER — PEN NEEDLE, DIABETIC 32GX 5/32"
1 NEEDLE, DISPOSABLE MISCELLANEOUS 4 TIMES DAILY
Qty: 400 EACH | Refills: 1 | Status: SHIPPED | OUTPATIENT
Start: 2025-08-06